# Patient Record
Sex: MALE | Race: BLACK OR AFRICAN AMERICAN | Employment: OTHER | URBAN - METROPOLITAN AREA
[De-identification: names, ages, dates, MRNs, and addresses within clinical notes are randomized per-mention and may not be internally consistent; named-entity substitution may affect disease eponyms.]

---

## 2022-07-22 ENCOUNTER — HOSPITAL ENCOUNTER (OUTPATIENT)
Age: 77
Setting detail: OBSERVATION
Discharge: HOME | End: 2022-07-24
Attending: EMERGENCY MEDICINE | Admitting: FAMILY MEDICINE
Payer: OTHER GOVERNMENT

## 2022-07-22 ENCOUNTER — APPOINTMENT (OUTPATIENT)
Dept: GENERAL RADIOLOGY | Age: 77
End: 2022-07-22
Payer: OTHER GOVERNMENT

## 2022-07-22 DIAGNOSIS — R07.9 CHEST PAIN, UNSPECIFIED TYPE: Primary | ICD-10-CM

## 2022-07-22 DIAGNOSIS — R06.09 DOE (DYSPNEA ON EXERTION): ICD-10-CM

## 2022-07-22 LAB
ALBUMIN SERPL-MCNC: 4.4 G/DL (ref 3.5–5.2)
ALP BLD-CCNC: 106 U/L (ref 40–129)
ALT SERPL-CCNC: 16 U/L (ref 0–40)
ANION GAP SERPL CALCULATED.3IONS-SCNC: 13 MMOL/L (ref 7–16)
AST SERPL-CCNC: 29 U/L (ref 0–39)
BASOPHILS ABSOLUTE: 0.03 E9/L (ref 0–0.2)
BASOPHILS RELATIVE PERCENT: 0.6 % (ref 0–2)
BILIRUB SERPL-MCNC: 0.4 MG/DL (ref 0–1.2)
BUN BLDV-MCNC: 23 MG/DL (ref 6–23)
CALCIUM SERPL-MCNC: 9.1 MG/DL (ref 8.6–10.2)
CHLORIDE BLD-SCNC: 102 MMOL/L (ref 98–107)
CO2: 27 MMOL/L (ref 22–29)
CREAT SERPL-MCNC: 1.4 MG/DL (ref 0.7–1.2)
EOSINOPHILS ABSOLUTE: 0.22 E9/L (ref 0.05–0.5)
EOSINOPHILS RELATIVE PERCENT: 4.1 % (ref 0–6)
GFR AFRICAN AMERICAN: 59
GFR NON-AFRICAN AMERICAN: 59 ML/MIN/1.73
GLUCOSE BLD-MCNC: 93 MG/DL (ref 74–99)
HCT VFR BLD CALC: 40.8 % (ref 37–54)
HEMOGLOBIN: 13.4 G/DL (ref 12.5–16.5)
IMMATURE GRANULOCYTES #: 0.01 E9/L
IMMATURE GRANULOCYTES %: 0.2 % (ref 0–5)
LYMPHOCYTES ABSOLUTE: 1.9 E9/L (ref 1.5–4)
LYMPHOCYTES RELATIVE PERCENT: 35.3 % (ref 20–42)
MCH RBC QN AUTO: 30.4 PG (ref 26–35)
MCHC RBC AUTO-ENTMCNC: 32.8 % (ref 32–34.5)
MCV RBC AUTO: 92.5 FL (ref 80–99.9)
MONOCYTES ABSOLUTE: 0.51 E9/L (ref 0.1–0.95)
MONOCYTES RELATIVE PERCENT: 9.5 % (ref 2–12)
NEUTROPHILS ABSOLUTE: 2.72 E9/L (ref 1.8–7.3)
NEUTROPHILS RELATIVE PERCENT: 50.3 % (ref 43–80)
PDW BLD-RTO: 13.1 FL (ref 11.5–15)
PLATELET # BLD: 172 E9/L (ref 130–450)
PMV BLD AUTO: 9.7 FL (ref 7–12)
POTASSIUM SERPL-SCNC: 3.9 MMOL/L (ref 3.5–5)
PRO-BNP: 327 PG/ML (ref 0–450)
RBC # BLD: 4.41 E12/L (ref 3.8–5.8)
SARS-COV-2, NAAT: NOT DETECTED
SODIUM BLD-SCNC: 142 MMOL/L (ref 132–146)
TOTAL PROTEIN: 7 G/DL (ref 6.4–8.3)
TROPONIN, HIGH SENSITIVITY: 16 NG/L (ref 0–11)
WBC # BLD: 5.4 E9/L (ref 4.5–11.5)

## 2022-07-22 PROCEDURE — 71046 X-RAY EXAM CHEST 2 VIEWS: CPT

## 2022-07-22 PROCEDURE — 87635 SARS-COV-2 COVID-19 AMP PRB: CPT

## 2022-07-22 PROCEDURE — 93005 ELECTROCARDIOGRAM TRACING: CPT | Performed by: NURSE PRACTITIONER

## 2022-07-22 PROCEDURE — 99285 EMERGENCY DEPT VISIT HI MDM: CPT

## 2022-07-22 PROCEDURE — 85378 FIBRIN DEGRADE SEMIQUANT: CPT

## 2022-07-22 PROCEDURE — 85025 COMPLETE CBC W/AUTO DIFF WBC: CPT

## 2022-07-22 PROCEDURE — 80053 COMPREHEN METABOLIC PANEL: CPT

## 2022-07-22 PROCEDURE — 83880 ASSAY OF NATRIURETIC PEPTIDE: CPT

## 2022-07-22 PROCEDURE — 84484 ASSAY OF TROPONIN QUANT: CPT

## 2022-07-22 NOTE — ED NOTES
Department of Emergency Medicine  FIRST PROVIDER TRIAGE NOTE             Independent MLP           7/22/22  4:47 PM EDT    Date of Encounter: 7/22/22   MRN: [id number]      HPI: Jose Daniel Corey is a 67 y.o. male who presents to the ED for Lyme Disease ([facility] told him to come here because he has the symptoms of lyme disease, rash on his left shoulder doesn't appear to be bulls eye, right hip pain, sometimes SOB)      ROS: Negative for fever or cough. PE: Gen Appearance/Constitutional: alert  Musculoskeletal: moves all extremities x 4     Initial Plan of Care: All treatment areas with department are currently occupied. Plan to order/Initiate the following while awaiting opening in ED: labs, EKG, and imaging studies.   Initiate Treatment-Testing, Proceed toTreatment Area When Bed Available for ED Attending/MLP to Continue Care    Electronically signed by MATEO Hernandez CNP   DD: 7/22/22      MATEO Bustos CNP  07/22/22 [id number]

## 2022-07-23 ENCOUNTER — APPOINTMENT (OUTPATIENT)
Dept: CT IMAGING | Age: 77
End: 2022-07-23
Payer: OTHER GOVERNMENT

## 2022-07-23 PROBLEM — R07.9 CHEST PAIN: Status: ACTIVE | Noted: 2022-07-23

## 2022-07-23 LAB
ANION GAP SERPL CALCULATED.3IONS-SCNC: 9 MMOL/L (ref 7–16)
BUN BLDV-MCNC: 22 MG/DL (ref 6–23)
CALCIUM SERPL-MCNC: 9.4 MG/DL (ref 8.6–10.2)
CHLORIDE BLD-SCNC: 104 MMOL/L (ref 98–107)
CHOLESTEROL, TOTAL: 236 MG/DL (ref 0–199)
CO2: 27 MMOL/L (ref 22–29)
CREAT SERPL-MCNC: 1.4 MG/DL (ref 0.7–1.2)
D DIMER: 278 NG/ML DDU
EKG ATRIAL RATE: 48 BPM
EKG P AXIS: 31 DEGREES
EKG P-R INTERVAL: 194 MS
EKG Q-T INTERVAL: 474 MS
EKG QRS DURATION: 88 MS
EKG QTC CALCULATION (BAZETT): 423 MS
EKG R AXIS: 5 DEGREES
EKG T AXIS: 59 DEGREES
EKG VENTRICULAR RATE: 48 BPM
GFR AFRICAN AMERICAN: 59
GFR NON-AFRICAN AMERICAN: 59 ML/MIN/1.73
GLUCOSE BLD-MCNC: 93 MG/DL (ref 74–99)
HDLC SERPL-MCNC: 81 MG/DL
LDL CHOLESTEROL CALCULATED: 143 MG/DL (ref 0–99)
LV EF: 53 %
LVEF MODALITY: NORMAL
POTASSIUM REFLEX MAGNESIUM: 4.6 MMOL/L (ref 3.5–5)
REASON FOR REJECTION: NORMAL
REASON FOR REJECTION: NORMAL
REJECTED TEST: NORMAL
REJECTED TEST: NORMAL
SODIUM BLD-SCNC: 140 MMOL/L (ref 132–146)
T4 FREE: 1.03 NG/DL (ref 0.93–1.7)
TRIGL SERPL-MCNC: 58 MG/DL (ref 0–149)
TROPONIN, HIGH SENSITIVITY: 14 NG/L (ref 0–11)
TSH SERPL DL<=0.05 MIU/L-ACNC: 7.35 UIU/ML (ref 0.27–4.2)
VLDLC SERPL CALC-MCNC: 12 MG/DL

## 2022-07-23 PROCEDURE — 86618 LYME DISEASE ANTIBODY: CPT

## 2022-07-23 PROCEDURE — 2580000003 HC RX 258: Performed by: NURSE PRACTITIONER

## 2022-07-23 PROCEDURE — 6370000000 HC RX 637 (ALT 250 FOR IP): Performed by: NURSE PRACTITIONER

## 2022-07-23 PROCEDURE — 6360000002 HC RX W HCPCS: Performed by: EMERGENCY MEDICINE

## 2022-07-23 PROCEDURE — 96361 HYDRATE IV INFUSION ADD-ON: CPT

## 2022-07-23 PROCEDURE — 99223 1ST HOSP IP/OBS HIGH 75: CPT | Performed by: INTERNAL MEDICINE

## 2022-07-23 PROCEDURE — 6370000000 HC RX 637 (ALT 250 FOR IP): Performed by: INTERNAL MEDICINE

## 2022-07-23 PROCEDURE — G0378 HOSPITAL OBSERVATION PER HR: HCPCS

## 2022-07-23 PROCEDURE — 96372 THER/PROPH/DIAG INJ SC/IM: CPT

## 2022-07-23 PROCEDURE — 80048 BASIC METABOLIC PNL TOTAL CA: CPT

## 2022-07-23 PROCEDURE — 84443 ASSAY THYROID STIM HORMONE: CPT

## 2022-07-23 PROCEDURE — APPSS60 APP SPLIT SHARED TIME 46-60 MINUTES: Performed by: NURSE PRACTITIONER

## 2022-07-23 PROCEDURE — 84484 ASSAY OF TROPONIN QUANT: CPT

## 2022-07-23 PROCEDURE — 80061 LIPID PANEL: CPT

## 2022-07-23 PROCEDURE — 2580000003 HC RX 258: Performed by: FAMILY MEDICINE

## 2022-07-23 PROCEDURE — 6370000000 HC RX 637 (ALT 250 FOR IP): Performed by: EMERGENCY MEDICINE

## 2022-07-23 PROCEDURE — 93306 TTE W/DOPPLER COMPLETE: CPT

## 2022-07-23 PROCEDURE — 96374 THER/PROPH/DIAG INJ IV PUSH: CPT

## 2022-07-23 PROCEDURE — 6360000002 HC RX W HCPCS: Performed by: FAMILY MEDICINE

## 2022-07-23 PROCEDURE — 84439 ASSAY OF FREE THYROXINE: CPT

## 2022-07-23 PROCEDURE — 36415 COLL VENOUS BLD VENIPUNCTURE: CPT

## 2022-07-23 RX ORDER — FINASTERIDE 5 MG/1
5 TABLET, FILM COATED ORAL DAILY
Status: DISCONTINUED | OUTPATIENT
Start: 2022-07-23 | End: 2022-07-24 | Stop reason: HOSPADM

## 2022-07-23 RX ORDER — ACETAMINOPHEN 650 MG/1
650 SUPPOSITORY RECTAL EVERY 6 HOURS PRN
Status: DISCONTINUED | OUTPATIENT
Start: 2022-07-23 | End: 2022-07-24 | Stop reason: HOSPADM

## 2022-07-23 RX ORDER — HYDRALAZINE HYDROCHLORIDE 20 MG/ML
10 INJECTION INTRAMUSCULAR; INTRAVENOUS EVERY 6 HOURS PRN
Status: DISCONTINUED | OUTPATIENT
Start: 2022-07-23 | End: 2022-07-24 | Stop reason: HOSPADM

## 2022-07-23 RX ORDER — ONDANSETRON 4 MG/1
4 TABLET, ORALLY DISINTEGRATING ORAL EVERY 8 HOURS PRN
Status: DISCONTINUED | OUTPATIENT
Start: 2022-07-23 | End: 2022-07-24 | Stop reason: HOSPADM

## 2022-07-23 RX ORDER — POLYETHYLENE GLYCOL 3350 17 G/17G
17 POWDER, FOR SOLUTION ORAL DAILY PRN
Status: DISCONTINUED | OUTPATIENT
Start: 2022-07-23 | End: 2022-07-24 | Stop reason: HOSPADM

## 2022-07-23 RX ORDER — HYDRALAZINE HYDROCHLORIDE 25 MG/1
25 TABLET, FILM COATED ORAL EVERY 8 HOURS SCHEDULED
Status: DISCONTINUED | OUTPATIENT
Start: 2022-07-23 | End: 2022-07-24 | Stop reason: HOSPADM

## 2022-07-23 RX ORDER — ONDANSETRON 2 MG/ML
4 INJECTION INTRAMUSCULAR; INTRAVENOUS EVERY 6 HOURS PRN
Status: DISCONTINUED | OUTPATIENT
Start: 2022-07-23 | End: 2022-07-24 | Stop reason: HOSPADM

## 2022-07-23 RX ORDER — LABETALOL HYDROCHLORIDE 5 MG/ML
10 INJECTION, SOLUTION INTRAVENOUS EVERY 6 HOURS PRN
Status: CANCELLED | OUTPATIENT
Start: 2022-07-23

## 2022-07-23 RX ORDER — AMLODIPINE BESYLATE 5 MG/1
5 TABLET ORAL DAILY
Status: DISCONTINUED | OUTPATIENT
Start: 2022-07-24 | End: 2022-07-24 | Stop reason: HOSPADM

## 2022-07-23 RX ORDER — MAGNESIUM HYDROXIDE/ALUMINUM HYDROXICE/SIMETHICONE 120; 1200; 1200 MG/30ML; MG/30ML; MG/30ML
30 SUSPENSION ORAL EVERY 6 HOURS PRN
Status: DISCONTINUED | OUTPATIENT
Start: 2022-07-23 | End: 2022-07-24 | Stop reason: HOSPADM

## 2022-07-23 RX ORDER — ASPIRIN 81 MG/1
81 TABLET, CHEWABLE ORAL DAILY
Status: DISCONTINUED | OUTPATIENT
Start: 2022-07-24 | End: 2022-07-24 | Stop reason: HOSPADM

## 2022-07-23 RX ORDER — HYDRALAZINE HYDROCHLORIDE 20 MG/ML
5 INJECTION INTRAMUSCULAR; INTRAVENOUS ONCE
Status: COMPLETED | OUTPATIENT
Start: 2022-07-23 | End: 2022-07-23

## 2022-07-23 RX ORDER — LEVOTHYROXINE SODIUM 0.05 MG/1
50 TABLET ORAL DAILY
Status: DISCONTINUED | OUTPATIENT
Start: 2022-07-23 | End: 2022-07-24 | Stop reason: HOSPADM

## 2022-07-23 RX ORDER — AMLODIPINE BESYLATE 10 MG/1
10 TABLET ORAL DAILY
Status: DISCONTINUED | OUTPATIENT
Start: 2022-07-23 | End: 2022-07-23

## 2022-07-23 RX ORDER — SODIUM CHLORIDE 9 MG/ML
INJECTION, SOLUTION INTRAVENOUS PRN
Status: DISCONTINUED | OUTPATIENT
Start: 2022-07-23 | End: 2022-07-24 | Stop reason: HOSPADM

## 2022-07-23 RX ORDER — SODIUM CHLORIDE 0.9 % (FLUSH) 0.9 %
5-40 SYRINGE (ML) INJECTION PRN
Status: DISCONTINUED | OUTPATIENT
Start: 2022-07-23 | End: 2022-07-24 | Stop reason: HOSPADM

## 2022-07-23 RX ORDER — ASPIRIN 81 MG/1
324 TABLET, CHEWABLE ORAL ONCE
Status: COMPLETED | OUTPATIENT
Start: 2022-07-23 | End: 2022-07-23

## 2022-07-23 RX ORDER — NITROGLYCERIN 0.4 MG/1
0.4 TABLET SUBLINGUAL EVERY 5 MIN PRN
Status: DISCONTINUED | OUTPATIENT
Start: 2022-07-23 | End: 2022-07-24 | Stop reason: HOSPADM

## 2022-07-23 RX ORDER — ATORVASTATIN CALCIUM 40 MG/1
40 TABLET, FILM COATED ORAL NIGHTLY
Status: DISCONTINUED | OUTPATIENT
Start: 2022-07-23 | End: 2022-07-24 | Stop reason: HOSPADM

## 2022-07-23 RX ORDER — ENOXAPARIN SODIUM 100 MG/ML
40 INJECTION SUBCUTANEOUS DAILY
Status: DISCONTINUED | OUTPATIENT
Start: 2022-07-23 | End: 2022-07-24 | Stop reason: HOSPADM

## 2022-07-23 RX ORDER — SODIUM CHLORIDE 0.9 % (FLUSH) 0.9 %
5-40 SYRINGE (ML) INJECTION EVERY 12 HOURS SCHEDULED
Status: DISCONTINUED | OUTPATIENT
Start: 2022-07-23 | End: 2022-07-24 | Stop reason: HOSPADM

## 2022-07-23 RX ORDER — ACETAMINOPHEN 325 MG/1
650 TABLET ORAL EVERY 6 HOURS PRN
Status: DISCONTINUED | OUTPATIENT
Start: 2022-07-23 | End: 2022-07-24 | Stop reason: HOSPADM

## 2022-07-23 RX ORDER — SODIUM CHLORIDE 9 MG/ML
INJECTION, SOLUTION INTRAVENOUS CONTINUOUS
Status: DISCONTINUED | OUTPATIENT
Start: 2022-07-23 | End: 2022-07-24 | Stop reason: HOSPADM

## 2022-07-23 RX ADMIN — ASPIRIN 324 MG: 81 TABLET, CHEWABLE ORAL at 01:18

## 2022-07-23 RX ADMIN — HYDRALAZINE HYDROCHLORIDE 25 MG: 25 TABLET, FILM COATED ORAL at 15:12

## 2022-07-23 RX ADMIN — SODIUM CHLORIDE: 9 INJECTION, SOLUTION INTRAVENOUS at 20:53

## 2022-07-23 RX ADMIN — SODIUM CHLORIDE: 9 INJECTION, SOLUTION INTRAVENOUS at 09:14

## 2022-07-23 RX ADMIN — AMLODIPINE BESYLATE 5 MG: 10 TABLET ORAL at 15:12

## 2022-07-23 RX ADMIN — HYDRALAZINE HYDROCHLORIDE 5 MG: 20 INJECTION INTRAMUSCULAR; INTRAVENOUS at 01:18

## 2022-07-23 RX ADMIN — ENOXAPARIN SODIUM 40 MG: 100 INJECTION SUBCUTANEOUS at 09:30

## 2022-07-23 RX ADMIN — HYDRALAZINE HYDROCHLORIDE 25 MG: 25 TABLET, FILM COATED ORAL at 20:54

## 2022-07-23 RX ADMIN — Medication 10 ML: at 10:00

## 2022-07-23 RX ADMIN — Medication 10 ML: at 20:54

## 2022-07-23 ASSESSMENT — PAIN - FUNCTIONAL ASSESSMENT: PAIN_FUNCTIONAL_ASSESSMENT: NONE - DENIES PAIN

## 2022-07-23 ASSESSMENT — PAIN SCALES - GENERAL
PAINLEVEL_OUTOF10: 0
PAINLEVEL_OUTOF10: 0

## 2022-07-23 NOTE — CONSULTS
Inpatient Cardiology Consultation      Reason for Consult:  Chest pain     Consulting Physician: Dr. Mallory Woodall    Requesting Physician:  Dr. Sivakumar Schmidt    Date of Consultation: 7/23/2022    HISTORY OF PRESENT ILLNESS:   Mr. Wyatt Falcon is a 69-year-old male who is new to [address] [place] [address]. Patient follows with the [facility] for the majority of his care. She does report being evaluated by cardiology at the [place] approximately 2 years ago and reports a \"normal stress test and echocardiogram.\"  At that time he was found to have left carotid stenosis on an ultrasound with no intervention needed at that time. PMHx: Left carotid stenosis (reported having an US ~ 2 years ago at [facility]), Hypothyroidism, Arthritis, BPH and HTN. [facility] on 7/22/2022 sent in from [facility] on [address]. Patient was told to come into the ED for possible Lyme disease. Patient reported approximately 7 days ago he began trimming his bushes outside and he suddenly noticed a rash over his left shoulder into his left neck with redness and itchiness. He stated since that time the rash has improved but he began noticing right lower extremity weakness in his hip and knee, MELTON x1 week ambulating up and down his basement steps along with left chest wall \"fluttering\" occurring while at rest, intermittent and lasting for seconds. He denies chest pain, pressure, tightness or burning. Approximately 2 days ago he began noticing a headache in the back of his head radiating into his neck for which he associated his symptoms being due to high blood pressure. He admits to intermittently taking his Norvasc and over the past month he has not taken it at all. Due to multiple symptoms he went to the [place] clinic and was sent into the ED for concerns of Lyme disease. Upon arrival to the ED: /106, heart rate 66, afebrile, 98% on room air. Labs: Sodium 142, potassium 3.9, BUN 23, creatinine 1.4, proBNP 327. High-sensitivity troponin 16, 14. TSH 7.350. Cholesterol panel: Total cholesterol 236, HDL 81, . WBC 5.4, H/H13.4/40.8, platelet count 137. D-dimer 278. SARS-CoV-2: Negative. Chest x-ray: No acute process. EKG: SB with marked sinus arrhythmia, nonspecific ST-T wave changes, rate 48 bpm. ER medications:  mg QD, Hydralazine 5 mg IV x 1. Upon initial consultation, patient is laying nearly flat in bed and appears to be in no acute distress. He denies any chest pain or shortness of breath. He has not felt palpitations since prior to admission. VSS. Currently on telemetry monitor he is sinus bradycardia /sinus rhythm. Please note: past medical records were reviewed per electronic medical record (EMR) - see detailed reports under Past Medical/ Surgical History. Past Medical History:    HTN  Hypothyroidism, on replacement therapy   Arthritis   BPH: on flomax  Reported normal echo and stress test at [facility], [address] [place] [address]) approximately 2 years ago. Reported carotid ultrasound showing left carotid stenosis per patient approximately 2 years ago at [place]. Further details are unknown. Medications Prior to admit:  Prior to Admission medications    Medication Sig Start Date End Date Taking?  Authorizing Provider   amLODIPine (NORVASC) 5 MG tablet Take 2 tablets by mouth daily 4/3/17   MATEO Morton CNP   docusate sodium (COLACE, DULCOLAX) 100 MG CAPS Take 100 mg by mouth nightly 4/3/17   MATEO Morton CNP   levothyroxine (SYNTHROID) 50 MCG tablet Take 1 tablet by mouth Daily 4/3/17   MATEO Morton CNP   finasteride (PROSCAR) 5 MG tablet Take 5 mg by mouth daily    Historical Provider, MD   tamsulosin (FLOMAX) 0.4 MG capsule Take 1 capsule by mouth daily for 14 days 2/22/17 3/30/17  Maria D Melo PA-C       Current Medications:    Current Facility-Administered Medications: amLODIPine (NORVASC) tablet 10 mg, 10 mg, Oral, Daily  finasteride (PROSCAR) tablet 5 mg, 5 mg, Oral, Daily  levothyroxine (SYNTHROID) tablet 50 mcg, 50 mcg, Oral, Daily  sodium chloride flush 0.9 % injection 5-40 mL, 5-40 mL, IntraVENous, 2 times per day  sodium chloride flush 0.9 % injection 5-40 mL, 5-40 mL, IntraVENous, PRN  0.9 % sodium chloride infusion, , IntraVENous, PRN  ondansetron (ZOFRAN-ODT) disintegrating tablet 4 mg, 4 mg, Oral, Q8H PRN **OR** ondansetron (ZOFRAN) injection 4 mg, 4 mg, IntraVENous, Q6H PRN  acetaminophen (TYLENOL) tablet 650 mg, 650 mg, Oral, Q6H PRN **OR** acetaminophen (TYLENOL) suppository 650 mg, 650 mg, Rectal, Q6H PRN  polyethylene glycol (GLYCOLAX) packet 17 g, 17 g, Oral, Daily PRN  [START ON 7/24/2022] aspirin chewable tablet 81 mg, 81 mg, Oral, Daily  atorvastatin (LIPITOR) tablet 40 mg, 40 mg, Oral, Nightly  perflutren lipid microspheres (DEFINITY) injection 1.65 mg, 1.5 mL, IntraVENous, ONCE PRN  aluminum & magnesium hydroxide-simethicone (MAALOX) 200-200-20 MG/5ML suspension 30 mL, 30 mL, Oral, Q6H PRN  enoxaparin (LOVENOX) injection 40 mg, 40 mg, SubCUTAneous, Daily  nitroGLYCERIN (NITROSTAT) SL tablet 0.4 mg, 0.4 mg, SubLINGual, Q5 Min PRN  iopamidol (ISOVUE-370) 76 % injection 60 mL, 60 mL, IntraVENous, ONCE PRN    Allergies:  Patient has no known allergies. Social History:    He lives with his wife at home  Denies using a walker or cane for ambulation. Lifelong non-smoker  Denies alcohol and illicit drug use. Family History: Noncontributory due to advanced age. REVIEW OF SYSTEMS:     Constitutional: + fatigue. Denies fevers, chills or night sweats  Eyes: Denies visual changes or drainage  ENT: Denies headaches or hearing loss. No mouth sores or sore throat. No epistaxis   Cardiovascular: Denies chest pain, pressure. +palpitations. No lower extremity swelling. Respiratory: Denies MELTON, cough, orthopnea or PND. No hemoptysis   Gastrointestinal: Denies hematemesis or anorexia. No hematochezia or melena    Genitourinary: Denies urgency, dysuria or hematuria. Musculoskeletal: See HPI.   Denies gait disturbance, weakness or joint complaints  Integumentary: +Rash left shoulder / left neck. Neurological: Denies dizziness, headaches or seizures. No numbness or tingling  Psychiatric: Denies anxiety or depression. Endocrine: Denies temperature intolerance. No recent weight change. .  Hematologic/Lymphatic: Denies abnormal bruising or bleeding. No swollen lymph nodes    PHYSICAL EXAM:   BP (!) 175/97   Pulse 62   Temp 97.9 °F (36.6 °C) (Temporal)   Resp 16   Ht 5' 8\" (1.727 m)   Wt 165 lb (74.8 kg)   SpO2 100%   BMI 25.09 kg/m²   CONST:  Well developed, well nourished  elderly AA male who appears of stated age. Awake, alert and cooperative. No apparent distress. HEENT:   Head- Normocephalic, atraumatic   Eyes- Conjunctivae pink, anicteric  Throat- Oral mucosa pink and moist  Neck-  No stridor, trachea midline, no jugular venous distention. +Faint left  carotid bruit. CHEST: Chest symmetrical and non-tender to palpation. No accessory muscle use or intercostal retractions  RESPIRATORY: Lung sounds - clear throughout fields. On RA. CARDIOVASCULAR:     Heart Inspection- shows no noted pulsations  Heart Palpation- no heaves or thrills; PMI is non-displaced   Heart Ausculation- Regular rate and rhythm, no murmur. No s3, s4 or rub   PV: No lower extremity edema. No varicosities. Pedal pulses palpable, no clubbing or cyanosis   ABDOMEN: Soft, non-tender to light palpation. Bowel sounds present. No palpable masses no organomegaly; no abdominal bruit  MS: Good muscle strength and tone. No atrophy or abnormal movements. : Deferred  SKIN: Warm and dry no statis dermatitis or ulcers   NEURO / PSYCH: Oriented to person, place and time. Speech clear and appropriate. Follows all commands. Flat affect. DATA:    ECG: See HPI. Tele strips: SB/SR with occasional PVCs.   Diagnostic:      Labs:   CBC:   Recent Labs     07/22/22  1838   WBC 5.4   HGB 13.4   HCT 40.8        BMP:   Recent Labs 07/22/22 1838 07/23/22  0605    140   K 3.9 4.6   CO2 27 27   BUN 23 22   CREATININE 1.4* 1.4*   LABGLOM 59 59   CALCIUM 9.1 9.4     Mag: No results for input(s): MG in the last 72 hours. Phos: No results for input(s): PHOS in the last 72 hours. TFT:   Lab Results   Component Value Date    TSH 7.350 (H) 07/23/2022      HgA1c: No results found for: LABA1C  No results found for: EAG  proBNP:   Recent Labs     07/22/22 1838   PROBNP 327     PT/INR: No results for input(s): PROTIME, INR in the last 72 hours. APTT:No results for input(s): APTT in the last 72 hours. CARDIAC ENZYMES:  Recent Labs     07/22/22 1838 07/23/22  0605   TROPHS 16* 14*     FASTING LIPID PANEL:  Lab Results   Component Value Date/Time    CHOL 236 07/23/2022 06:05 AM    HDL 81 07/23/2022 06:05 AM    LDLCALC 143 07/23/2022 06:05 AM    TRIG 58 07/23/2022 06:05 AM     LIVER PROFILE:  Recent Labs     07/22/22 1838   AST 29   ALT 16   LABALBU 4.4     Chest x-ray: 7/22/2022:  No acute process. Assessment/plan to follow as per Dr. Maria Luisa Anderson.     Electronically signed by MATEO Arevalo CNP on 7/23/22 at 12:32 PM EDT

## 2022-07-23 NOTE — H&P
Hospital Medicine History & Physical      PCP: No primary care provider on file. Date of Admission: 7/22/2022    Date of Service: Pt seen/examined on 7/23/22  and  Placed in Observation. Chief Complaint:  SOB       History Of Present Illness:    67 y.o. male with past medial history of HTN and Hypothyroidism . Patient presents to the Ed due to chest pressure and shortness of breath . He also also reports generalized body aches and brain fog . He states that thought he had lyme disease . 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 He reports no tick bite but  rash located on shoulder lateral neck area  He states that he has shortness of breath on laying flat . He reports chest pressure in sternal area . He reports no neck jaw pain or arm pain . He reports diaphoresis as well. He states that he has had these symptoms for the  past 5 two weeks. He reports no fever chills . He also states that he has not taken his blood pressure medication in several weeks. In the Ed patient was 203/88 1000 %on RA afebrile . Lab data reveal sodium 142   potassium 3.9 BUN 23 creatinine 1.4    Trop 16 WBC 5.4 HGB 13.4  COVID neg Dimer 278 EKG sinus arrhthymia HR 48 CXR neg . Patient will be admitted for further evaluation     Past Medical History:          Diagnosis Date    Arthritis     Hypertension     Thyroid disease        Past Surgical History:      History reviewed. No pertinent surgical history. Medications Prior to Admission:      Prior to Admission medications    Medication Sig Start Date End Date Taking?  Authorizing Provider   amLODIPine (NORVASC) 5 MG tablet Take 2 tablets by mouth daily 4/3/17   MATEO Morton CNP   docusate sodium (COLACE, DULCOLAX) 100 MG CAPS Take 100 mg by mouth nightly 4/3/17   MAETO Morton CNP   levothyroxine (SYNTHROID) 50 MCG tablet Take 1 tablet by mouth Daily 4/3/17   MATEO Morton CNP   finasteride (PROSCAR) 5 MG tablet Take 5 mg by mouth daily Historical Provider, MD   tamsulosin (FLOMAX) 0.4 MG capsule Take 1 capsule by mouth daily for 14 days 2/22/17 3/30/17  Duyen Melo PA-C       Allergies:  Patient has no known allergies. Social History:      The patient currently lives with family     TOBACCO:   reports that he has never smoked. He has never used smokeless tobacco.  ETOH:   reports current alcohol use. Family History:      Reviewed in detail and negative for DM, CAD, Cancer, CVA. Positive as follows:    History reviewed. No pertinent family history. REVIEW OF SYSTEMS:   Pertinent positives as noted in the HPI. All other systems reviewed and negative. PHYSICAL EXAM:    BP (!) 203/88   Pulse 59   Temp 98 °F (36.7 °C)   Resp 18   Ht 5' 8\" (1.727 m)   Wt 165 lb (74.8 kg)   SpO2 100%   BMI 25.09 kg/m²     General appearance:  No apparent distress, appears stated age and cooperative. HEENT:  Normal cephalic, atraumatic without obvious deformity. Pupils equal, round, and reactive to light. Extra ocular muscles intact. Conjunctivae/corneas clear. Neck: Supple, with full range of motion. No jugular venous distention. Trachea midline. Respiratory:  Normal respiratory effort. Clear to auscultation, bilaterally without Rales/Wheezes/Rhonchi. Cardiovascular:   CP not reproducible on palpation Regular rate and rhythm with normal S1/S2 without murmurs, rubs or gallops. Abdomen: Soft, non-tender, non-distended with normal bowel sounds. Musculoskeletal:  No clubbing, cyanosis or edema bilaterally. Full range of motion without deformity. Skin: Skin color, texture, turgor normal.  No rashes or lesions. Neurologic:  Neurovascularly intact without any focal sensory/motor deficits.  Cranial nerves: II-XII intact, grossly non-focal.  Psychiatric:  Alert and oriented, thought content appropriate, normal insight      Labs:     Recent Labs     07/22/22 1838   WBC 5.4   HGB 13.4   HCT 40.8        Recent Labs     07/22/22 1838    K 3.9      CO2 27   BUN 23   CREATININE 1.4*   CALCIUM 9.1     Recent Labs     07/22/22  1838   AST 29   ALT 16   BILITOT 0.4   ALKPHOS 106     No results for input(s): INR in the last 72 hours. No results for input(s): Rosemary Height in the last 72 hours. Urinalysis:      Lab Results   Component Value Date/Time    NITRU Negative 03/30/2017 12:10 PM    [address] NONE 03/30/2017 12:10 PM    BACTERIA PRESENT 03/30/2017 12:10 PM    RBCUA >20 03/30/2017 12:10 PM    BLOODU LARGE 03/30/2017 12:10 PM    SPECGRAV >=1.030 03/30/2017 12:10 PM    GLUCOSEU Negative 03/30/2017 12:10 PM         ASSESSMENT:    Active Hospital Problems    Diagnosis Date Noted    Chest pain [R07.9] 07/23/2022     Priority: Medium       PLAN:    Chest pain   ?if secondary to elevated BP . BP was 218/106. Patient reports not taking BP meds for several weeks  Trop 15 EKG revealed no acute ST changes but sinus bradycardia . CXR neg Dimer 278 . Admit observation vitals telemetry CTA chest cardiology consult echo BP control Lipid TSH     Hypertensive Urgency : BP was 218/106 sec to non compliance . C/w Norvasc hydralazine PRN     Acute Kidney injury : Scr 1.4 baseline 1.1-1.2     ? Lyme Disease : no bull eye rash noted , ordered Lyme studies     Hypothyroidism : C/w Synthroid             DVT Prophylaxis: Lovenox   Diet: No diet orders on file  Code Status: No Order      Dispo - likely home        Tuan Sanchez MD

## 2022-07-23 NOTE — PROGRESS NOTES
Patient is refusing scan at this time stating he does not want contrast.  This technologist spoke with Dr. Corrinne Distel to inform him of patient's decision.

## 2022-07-23 NOTE — PLAN OF CARE
Problem: Pain  Goal: Verbalizes/displays adequate comfort level or baseline comfort level  Outcome: Progressing     Problem: Discharge Planning  Goal: Discharge to home or other facility with appropriate resources  Recent Flowsheet Documentation  Taken 7/23/2022 0505 by Gayatri Turcios RN  Discharge to home or other facility with appropriate resources: Identify barriers to discharge with patient and caregiver

## 2022-07-23 NOTE — ED PROVIDER NOTES
Department of Emergency Medicine   ED  Provider Note  Admit Date/RoomTime: 7/22/2022 11:35 PM  ED Room: [facility]"" [place]          History of Present Illness:  7/22/22, Time: 11:55 PM EDT  Chief Complaint   Patient presents with    Shortness of [address] told him to come here because he has the symptoms of lyme disease, rash on his left shoulder doesn't appear to be bulls eye, right hip pain, sometimes SOB                Anca Romero is a 67 y.o. male presenting to the ED for chest pain and shortness of breath, beginning 2 weeks ago. The complaint has been intermittent, moderate in severity, and worsened by exertion. Patient reports she is having exertional dyspnea as well as tightness in the chest for 2 weeks. He says he feels fatigued and out of gas. He says that he feels short of breath. He is also having some orthopnea. He reports a heavy tightness in his chest as well. He also reports he has been tired and achy. He reports that he called the [place] and they thought he could have Lyme disease and they told him to come here. He also reports he noticed a small scaly rash along his left shoulder/lateral neck area. No bull's-eye lesions. No tenderness. He says it itches. He denies any tick bites or exposures. No fever or chills. No abdominal pain or back pain. No syncope. No joint swelling. No joint effusions. Review of Systems:   A complete review of systems was performed and pertinent positives and negatives are stated within HPI, all other systems reviewed and are negative.        --------------------------------------------- PAST HISTORY ---------------------------------------------  Past Medical History:  has a past medical history of Arthritis, Hypertension, and Thyroid disease. Past Surgical History:  has no past surgical history on file. Social History:  reports that he has never smoked. He has never used smokeless tobacco. He reports current alcohol use.  He reports that he does not use drugs. Family History: family history is not on file. . Unless otherwise noted, family history is non contributory    The patients home medications have been reviewed. Allergies: Patient has no known allergies. I have reviewed the past medical history, past surgical history, social history, and family history    ---------------------------------------------------PHYSICAL EXAM--------------------------------------    Constitutional/General: Alert and oriented x3  Head: Normocephalic and atraumatic  Eyes: PERRL, EOMI, sclera non icteric  ENT: Oropharynx clear, handling secretions   Neck: Supple, full ROM, no stridor, no meningeal signs  Respiratory: Lungs clear to auscultation bilaterally, no wheezes, rales, or rhonchi. Not in respiratory distress  Cardiovascular: Bradycardic but regular rhythm. No murmurs, no gallops, no rubs. 2+ distal pulses. Equal extremity pulses. Gastrointestinal:  Abdomen Soft, Non tender, Non distended. No rebound, guarding, or rigidity. No pulsatile masses. Musculoskeletal: Moves all extremities x 4. Warm and well perfused, no clubbing, no cyanosis, no edema. Capillary refill <3 seconds  Skin: skin warm and dry. No target lesion or bull's-eye rash. He has approximately a nickel sized area of scaly skin along his left lateral neck along the clavicle area although it is not a bull's-eye rash, no erythema migrans, no crepitus, nontender. Neurologic: GCS 15, no focal deficits, symmetric strength 5/5 in the upper and lower extremities bilaterally  Psychiatric: Normal Affect          -------------------------------------------------- RESULTS -------------------------------------------------  I have personally reviewed all laboratory and imaging results for this patient. Results are listed below.      LABS: (Lab results interpreted by me)  Results for orders placed or performed during the hospital encounter of 07/22/22   COVID-19, Rapid    Specimen: Nasopharyngeal Swab Result Value Ref Range    SARS-CoV-2, NAAT Not Detected Not Detected   CBC with Auto Differential   Result Value Ref Range    WBC 5.4 4.5 - 11.5 E9/L    RBC 4.41 3.80 - 5.80 E12/L    Hemoglobin 13.4 12.5 - 16.5 g/dL    Hematocrit 40.8 37.0 - 54.0 %    MCV 92.5 80.0 - 99.9 fL    MCH 30.4 26.0 - 35.0 pg    MCHC 32.8 32.0 - 34.5 %    RDW 13.1 11.5 - 15.0 fL    Platelets 904 618 - 191 E9/L    MPV 9.7 7.0 - 12.0 fL    Neutrophils % 50.3 43.0 - 80.0 %    Immature Granulocytes % 0.2 0.0 - 5.0 %    Lymphocytes % 35.3 20.0 - 42.0 %    Monocytes % 9.5 2.0 - 12.0 %    Eosinophils % 4.1 0.0 - 6.0 %    Basophils % 0.6 0.0 - 2.0 %    Neutrophils Absolute 2.72 1.80 - 7.30 E9/L    Immature Granulocytes # 0.01 E9/L    Lymphocytes Absolute 1.90 1.50 - 4.00 E9/L    Monocytes Absolute 0.51 0.10 - 0.95 E9/L    Eosinophils Absolute 0.22 0.05 - 0.50 E9/L    Basophils Absolute 0.03 0.00 - 0.20 E9/L   Comprehensive Metabolic Panel   Result Value Ref Range    Sodium 142 132 - 146 mmol/L    Potassium 3.9 3.5 - 5.0 mmol/L    Chloride 102 98 - 107 mmol/L    CO2 27 22 - 29 mmol/L    Anion Gap 13 7 - 16 mmol/L    Glucose 93 74 - 99 mg/dL    BUN 23 6 - 23 mg/dL    Creatinine 1.4 (H) 0.7 - 1.2 mg/dL    GFR Non-African American 59 >=60 mL/min/1.73    GFR African American 59     Calcium 9.1 8.6 - 10.2 mg/dL    Total Protein 7.0 6.4 - 8.3 g/dL    Albumin 4.4 3.5 - 5.2 g/dL    Total Bilirubin 0.4 0.0 - 1.2 mg/dL    Alkaline Phosphatase 106 40 - 129 U/L    ALT 16 0 - 40 U/L    AST 29 0 - 39 U/L   Troponin   Result Value Ref Range    Troponin, High Sensitivity 16 (H) 0 - 11 ng/L   Brain Natriuretic Peptide   Result Value Ref Range    Pro- 0 - 450 pg/mL   EKG 12 Lead   Result Value Ref Range    Ventricular Rate 48 BPM    Atrial Rate 48 BPM    P-R Interval 194 ms    QRS Duration 88 ms    Q-T Interval 474 ms    QTc Calculation (Bazett) 423 ms    P Axis 31 degrees    R Axis 5 degrees    T Axis 59 degrees   ,       RADIOLOGY:  Interpreted by Radiologist unless otherwise specified  XR CHEST (2 VW)   Final Result   No acute process. EKG Interpretation  Interpreted by emergency department physician, Dr. Barbara Musa     Date of EK22  Time:     Rhythm: sinus bradycardia  Rate: bradycardia  Axis: normal  Conduction: normal  ST Segments: no acute change  T Waves: biphasic in V3    Clinical Impression: Sinus bradycardia with sinus arrhythmia with new biphasic T wave in V3  Comparison to prior EKG: changes compared to previous EKG      ------------------------- NURSING NOTES AND VITALS REVIEWED ---------------------------   The nursing notes within the ED encounter and vital signs as below have been reviewed by myself  BP (!) 159/106   Pulse 66   Temp 98 °F (36.7 °C)   Resp 19   Ht 5' 8\" (1.727 m)   Wt 165 lb (74.8 kg)   SpO2 98%   BMI 25.09 kg/m²     Oxygen Saturation Interpretation: Normal    The patients available past medical records and past encounters were reviewed. ------------------------------ ED COURSE/MEDICAL DECISION MAKING----------------------  Medications   aspirin chewable tablet 324 mg (has no administration in time range)              I, Dr. Barbara Musa, am the primary provider of record    Medical Decision Making:   I am concerned with his exertional dyspnea as well as exertional pressure in his chest.  He also has some orthopnea as well. I see no rash to support Lyme disease and there was no tick bite and while he does feel fatigued I see no signs of heart block on his EKG and again no rash or other symptoms except the fatigue. I see no infectious sx at this time either. He does have what appears to be a Wellens sign in lead V3 which is new from prior. His troponin was 15 and this will be repeated. He has no chest pain at this time. No recent cardiac evaluation or stress testing. I have consulted medicine for admission for further evaluation. Oxygen Saturation Interpretation: 98 % on RA.

## 2022-07-23 NOTE — PROGRESS NOTES
Hospitalist Progress Note      Patient admitted this morning by my colleague for chest pain, YAJARIA, and hypertensive urgency. He admitted to noncompliance with his BP meds. Cardiology has been consulted. CTA of the chest was ordered given elevated D-dimer, however, patient is refusing. TSH is elevated though compliance with home levothyroxine is in question. Will initiate IV fluids, monitor renal function. Non-billable rounding. Thanks for allowing us to participate in the care of Toya Tierney. We will continue to follow along.  Please do not hesitate to contact us if needed.  +++++++++++++++++++++++++++++++++++++++++++++++++  BLU Dumas/ Dom Medel [address] [place] [address]

## 2022-07-24 ENCOUNTER — APPOINTMENT (OUTPATIENT)
Dept: NUCLEAR MEDICINE | Age: 77
End: 2022-07-24
Payer: OTHER GOVERNMENT

## 2022-07-24 VITALS
HEART RATE: 53 BPM | BODY MASS INDEX: 25.01 KG/M2 | RESPIRATION RATE: 18 BRPM | DIASTOLIC BLOOD PRESSURE: 99 MMHG | SYSTOLIC BLOOD PRESSURE: 161 MMHG | OXYGEN SATURATION: 99 % | HEIGHT: 68 IN | TEMPERATURE: 97.4 F | WEIGHT: 165 LBS

## 2022-07-24 LAB
ANION GAP SERPL CALCULATED.3IONS-SCNC: 11 MMOL/L (ref 7–16)
BUN BLDV-MCNC: 19 MG/DL (ref 6–23)
CALCIUM SERPL-MCNC: 8.9 MG/DL (ref 8.6–10.2)
CHLORIDE BLD-SCNC: 107 MMOL/L (ref 98–107)
CO2: 21 MMOL/L (ref 22–29)
CREAT SERPL-MCNC: 1.3 MG/DL (ref 0.7–1.2)
GFR AFRICAN AMERICAN: >60
GFR NON-AFRICAN AMERICAN: >60 ML/MIN/1.73
GLUCOSE BLD-MCNC: 85 MG/DL (ref 74–99)
HCT VFR BLD CALC: 40.5 % (ref 37–54)
HEMOGLOBIN: 13.6 G/DL (ref 12.5–16.5)
LV EF: 53 %
LVEF MODALITY: NORMAL
MCH RBC QN AUTO: 30 PG (ref 26–35)
MCHC RBC AUTO-ENTMCNC: 33.6 % (ref 32–34.5)
MCV RBC AUTO: 89.4 FL (ref 80–99.9)
PDW BLD-RTO: 13.1 FL (ref 11.5–15)
PLATELET # BLD: 177 E9/L (ref 130–450)
PMV BLD AUTO: 9.7 FL (ref 7–12)
POTASSIUM REFLEX MAGNESIUM: 3.7 MMOL/L (ref 3.5–5)
RBC # BLD: 4.53 E12/L (ref 3.8–5.8)
SODIUM BLD-SCNC: 139 MMOL/L (ref 132–146)
WBC # BLD: 4.4 E9/L (ref 4.5–11.5)

## 2022-07-24 PROCEDURE — 3430000000 HC RX DIAGNOSTIC RADIOPHARMACEUTICAL: Performed by: RADIOLOGY

## 2022-07-24 PROCEDURE — G0378 HOSPITAL OBSERVATION PER HR: HCPCS

## 2022-07-24 PROCEDURE — 93018 CV STRESS TEST I&R ONLY: CPT | Performed by: INTERNAL MEDICINE

## 2022-07-24 PROCEDURE — 2580000003 HC RX 258: Performed by: FAMILY MEDICINE

## 2022-07-24 PROCEDURE — 93017 CV STRESS TEST TRACING ONLY: CPT

## 2022-07-24 PROCEDURE — 96372 THER/PROPH/DIAG INJ SC/IM: CPT

## 2022-07-24 PROCEDURE — 99232 SBSQ HOSP IP/OBS MODERATE 35: CPT | Performed by: INTERNAL MEDICINE

## 2022-07-24 PROCEDURE — 6370000000 HC RX 637 (ALT 250 FOR IP): Performed by: NURSE PRACTITIONER

## 2022-07-24 PROCEDURE — 6360000002 HC RX W HCPCS: Performed by: NURSE PRACTITIONER

## 2022-07-24 PROCEDURE — 36415 COLL VENOUS BLD VENIPUNCTURE: CPT

## 2022-07-24 PROCEDURE — 85027 COMPLETE CBC AUTOMATED: CPT

## 2022-07-24 PROCEDURE — 96361 HYDRATE IV INFUSION ADD-ON: CPT

## 2022-07-24 PROCEDURE — 80048 BASIC METABOLIC PNL TOTAL CA: CPT

## 2022-07-24 PROCEDURE — 78452 HT MUSCLE IMAGE SPECT MULT: CPT | Performed by: INTERNAL MEDICINE

## 2022-07-24 PROCEDURE — 6370000000 HC RX 637 (ALT 250 FOR IP): Performed by: FAMILY MEDICINE

## 2022-07-24 PROCEDURE — 6370000000 HC RX 637 (ALT 250 FOR IP): Performed by: INTERNAL MEDICINE

## 2022-07-24 PROCEDURE — A9500 TC99M SESTAMIBI: HCPCS | Performed by: RADIOLOGY

## 2022-07-24 PROCEDURE — 78452 HT MUSCLE IMAGE SPECT MULT: CPT

## 2022-07-24 PROCEDURE — 93016 CV STRESS TEST SUPVJ ONLY: CPT | Performed by: INTERNAL MEDICINE

## 2022-07-24 PROCEDURE — 93005 ELECTROCARDIOGRAM TRACING: CPT | Performed by: FAMILY MEDICINE

## 2022-07-24 PROCEDURE — 6360000002 HC RX W HCPCS: Performed by: FAMILY MEDICINE

## 2022-07-24 RX ORDER — HYDRALAZINE HYDROCHLORIDE 25 MG/1
25 TABLET, FILM COATED ORAL EVERY 8 HOURS SCHEDULED
Qty: 90 TABLET | Refills: 0 | Status: SHIPPED | OUTPATIENT
Start: 2022-07-24 | End: 2022-07-24 | Stop reason: SDUPTHER

## 2022-07-24 RX ORDER — HYDRALAZINE HYDROCHLORIDE 25 MG/1
25 TABLET, FILM COATED ORAL EVERY 8 HOURS SCHEDULED
Qty: 90 TABLET | Refills: 0 | Status: SHIPPED | OUTPATIENT
Start: 2022-07-24

## 2022-07-24 RX ORDER — ASPIRIN 81 MG/1
81 TABLET, CHEWABLE ORAL DAILY
Qty: 30 TABLET | Refills: 0 | Status: SHIPPED | OUTPATIENT
Start: 2022-07-25 | End: 2022-07-24 | Stop reason: SDUPTHER

## 2022-07-24 RX ORDER — ATORVASTATIN CALCIUM 40 MG/1
40 TABLET, FILM COATED ORAL NIGHTLY
Qty: 30 TABLET | Refills: 0 | Status: SHIPPED | OUTPATIENT
Start: 2022-07-24 | End: 2022-07-24 | Stop reason: SDUPTHER

## 2022-07-24 RX ORDER — ATORVASTATIN CALCIUM 40 MG/1
40 TABLET, FILM COATED ORAL NIGHTLY
Qty: 30 TABLET | Refills: 0 | Status: SHIPPED | OUTPATIENT
Start: 2022-07-24

## 2022-07-24 RX ORDER — ASPIRIN 81 MG/1
81 TABLET, CHEWABLE ORAL DAILY
Qty: 30 TABLET | Refills: 0 | Status: ON HOLD | OUTPATIENT
Start: 2022-07-25 | End: 2022-08-25 | Stop reason: HOSPADM

## 2022-07-24 RX ADMIN — Medication 38 MILLICURIE: at 09:30

## 2022-07-24 RX ADMIN — Medication 12 MILLICURIE: at 08:07

## 2022-07-24 RX ADMIN — ENOXAPARIN SODIUM 40 MG: 100 INJECTION SUBCUTANEOUS at 13:24

## 2022-07-24 RX ADMIN — Medication 10 ML: at 13:33

## 2022-07-24 RX ADMIN — AMLODIPINE BESYLATE 5 MG: 10 TABLET ORAL at 13:23

## 2022-07-24 RX ADMIN — REGADENOSON 0.4 MG: 0.08 INJECTION, SOLUTION INTRAVENOUS at 09:29

## 2022-07-24 RX ADMIN — HYDRALAZINE HYDROCHLORIDE 25 MG: 25 TABLET, FILM COATED ORAL at 06:16

## 2022-07-24 RX ADMIN — LEVOTHYROXINE SODIUM 50 MCG: 0.05 TABLET ORAL at 06:16

## 2022-07-24 RX ADMIN — ASPIRIN 81 MG CHEWABLE TABLET 81 MG: 81 TABLET CHEWABLE at 13:23

## 2022-07-24 NOTE — PROGRESS NOTES
Hospitalist Progress Note      Synopsis: Patient admitted for chest pain, YAJAIRA, and hypertensive urgency. Patient presented to the ED with complaints of chest pressure and shortness of breath. He also endorsed generalized body aches, diaphoresis, and \"brain fog. \"  He is concerned for Lyme disease as he notes a rash on his shoulder/neck. He has no known tick bite. In ED he was found to be significantly hypertensive-203/88. He admits to noncompliance with his blood pressure medications. Cardiology was consulted who plans for stress test today. Hospital day 0     Subjective:  Stable overnight. Pt refusing PRN antihypertensives intermittently. He is also refusing CTA. Patient seen and examined s/p stress test. Reports that the symptoms prompting his presentation have resolved and have no returned. Feels good. No complaints. Anxious for discharge. Records reviewed. Temp (24hrs), Av.6 °F (37 °C), Min:98.3 °F (36.8 °C), Max:99.2 °F (37.3 °C)    DIET: Diet NPO Exceptions are: Sips of Water with Meds  CODE: Full Code  No intake or output data in the 24 hours ending 22 0804    Review of Systems: All bolded are positive; please see HPI  General:  Fever, chills, diaphoresis, fatigue, malaise, night sweats, weight loss  Psychological:  Anxiety, disorientation, hallucinations. ENT:  Epistaxis, headaches, vertigo, visual changes. Cardiovascular:  Chest pain, irregular heartbeats, palpitations, paroxysmal nocturnal dyspnea. Respiratory:  Shortness of breath, coughing, sputum production, hemoptysis, wheezing, orthopnea.   Gastrointestinal:  Nausea, vomiting, diarrhea, heartburn, constipation, abdominal pain, hematemesis, hematochezia, melena, acholic stools  Genito-Urinary:  Dysuria, urgency, frequency, hematuria  Musculoskeletal:  Joint pain, joint stiffness, joint swelling, muscle pain  Neurology:  Headache, focal neurological deficits, weakness, numbness, paresthesia  Derm:  Rashes, ulcers, excoriations, bruising  Extremities:  Decreased ROM, peripheral edema, mottling    Objective:    BP (!) 171/99   Pulse 56   Temp 98.6 °F (37 °C) (Temporal)   Resp 17   Ht 5' 8\" (1.727 m)   Wt 165 lb (74.8 kg)   SpO2 100%   BMI 25.09 kg/m²     General appearance: Elderly male in no apparent distress, appears stated age and cooperative. HEENT: Conjunctivae/corneas clear. Mucous membranes moist.  Neck: Supple. No JVD. Respiratory:  Clear to auscultation bilaterally. Normal respiratory effort. Cardiovascular:  RRR. S1, S2 without MRG. PV: Pulses palpable. No edema. Abdomen: Soft, non-tender, non-distended. +BS  Musculoskeletal: No obvious deformities. Skin: Normal skin color. No rashes or lesions. Good turgor. Neurologic:  Grossly non-focal. Awake, alert, following commands.    Psychiatric: Alert and oriented, thought content appropriate, normal insight and judgement    Medications:  REVIEWED DAILY    Infusion Medications    sodium chloride      sodium chloride 75 mL/hr at 07/23/22 2053     Scheduled Medications    finasteride  5 mg Oral Daily    levothyroxine  50 mcg Oral Daily    sodium chloride flush  5-40 mL IntraVENous 2 times per day    aspirin  81 mg Oral Daily    atorvastatin  40 mg Oral Nightly    enoxaparin  40 mg SubCUTAneous Daily    hydrALAZINE  25 mg Oral 3 times per day    amLODIPine  5 mg Oral Daily     PRN Meds: sodium chloride flush, sodium chloride, ondansetron **OR** ondansetron, acetaminophen **OR** acetaminophen, polyethylene glycol, aluminum & magnesium hydroxide-simethicone, nitroGLYCERIN, iopamidol, perflutren lipid microspheres, regadenoson, hydrALAZINE    Labs:     Recent Labs     07/22/22  1838 07/24/22  0536   WBC 5.4 4.4*   HGB 13.4 13.6   HCT 40.8 40.5    177       Recent Labs     07/22/22  1838 07/23/22  0605 07/24/22  0536    140 139   K 3.9 4.6 3.7    104 107   CO2 27 27 21*   BUN 23 22 19   CREATININE 1.4* 1.4* 1.3*   CALCIUM 9.1 9.4 8.9

## 2022-07-24 NOTE — PROGRESS NOTES
Pt bp elevated at 184/96. Attempted to administer PRN Hydralazine 10mg IV, pt refused d/t concerns of previous dose, given at 2038, being too close and damaging his kidneys. Explained to pt the consequences of high bp on his kidneys and difference of taking PO medications vs IV. Pt still refusing bp med.

## 2022-07-24 NOTE — DISCHARGE INSTR - DIET

## 2022-07-24 NOTE — PROGRESS NOTES
Oral Daily PRN Luis Antonio Headley MD        aspirin chewable tablet 81 mg  81 mg Oral Daily Luis Antonio Headley MD        atorvastatin (LIPITOR) tablet 40 mg  40 mg Oral Nightly Luis Antonio Headley MD        aluminum & magnesium hydroxide-simethicone (MAALOX) 200-200-20 MG/5ML suspension 30 mL  30 mL Oral Q6H PRN Luis Antonio Headley MD        enoxaparin (LOVENOX) injection 40 mg  40 mg SubCUTAneous Daily Luis Antonio Headley MD   40 mg at 07/23/22 0930    nitroGLYCERIN (NITROSTAT) SL tablet 0.4 mg  0.4 mg SubLINGual Q5 Min PRN Luis Antonio Headley MD        iopamidol (ISOVUE-370) 76 % injection 60 mL  60 mL IntraVENous ONCE PRN Bronson Tejada MD        0.9 % sodium chloride infusion   IntraVENous Continuous MATEO Delgado NP 75 mL/hr at 07/23/22 2053 New Bag at 07/23/22 2053    perflutren lipid microspheres (DEFINITY) injection 1.65 mg  1.5 mL IntraVENous ONCE PRN MATEO Cai CNP        regadenoson (LEXISCAN) injection 0.4 mg  0.4 mg IntraVENous ONCE PRN MATEO Cai CNP        hydrALAZINE (APRESOLINE) tablet 25 mg  25 mg Oral 3 times per day MATEO Cai CNP   25 mg at 07/24/22 0616    amLODIPine (NORVASC) tablet 5 mg  5 mg Oral Daily Bard Temo MD   5 mg at 07/23/22 1512    hydrALAZINE (APRESOLINE) injection 10 mg  10 mg IntraVENous Q6H PRN Bard Temo MD          sodium chloride      sodium chloride 75 mL/hr at 07/23/22 2053       Physical Exam:  BP (!) 171/99   Pulse 56   Temp 98.6 °F (37 °C) (Temporal)   Resp 17   Ht 5' 8\" (1.727 m)   Wt 165 lb (74.8 kg)   SpO2 100%   BMI 25.09 kg/m²   Wt Readings from Last 3 Encounters:   07/22/22 165 lb (74.8 kg)   03/30/17 176 lb (79.8 kg)   02/22/17 170 lb (77.1 kg)     Appearance: Awake, alert, no acute respiratory distress  Skin: Intact, no rash  Head: Normocephalic, atraumatic  Eyes: EOMI, no conjunctival erythema  ENMT: No pharyngeal erythema, MMM, no rhinorrhea  Neck: Supple, no elevated JVP, no carotid bruits  Lungs: Clear to auscultation bilaterally. No wheezes, rales, or rhonchi. Cardiac: Regular rate and rhythm, +S1S2, no murmurs apparent  Abdomen: Soft, nontender, +bowel sounds  Extremities: Moves all extremities x 4, no lower extremity edema  Neurologic: No focal motor deficits apparent, normal mood and affect  Peripheral Pulses: Intact posterior tibial pulses bilaterally    Intake/Output:  No intake or output data in the 24 hours ending 07/24/22 0933  No intake/output data recorded.     Laboratory Tests:  Recent Labs     07/22/22 1838 07/23/22  0605 07/24/22  0536    140 139   K 3.9 4.6 3.7    104 107   CO2 27 27 21*   BUN 23 22 19   CREATININE 1.4* 1.4* 1.3*   GLUCOSE 93 93 85   CALCIUM 9.1 9.4 8.9     No results found for: MG  Recent Labs     07/22/22 1838   ALKPHOS 106   ALT 16   AST 29   PROT 7.0   BILITOT 0.4   LABALBU 4.4     Recent Labs     07/22/22 1838 07/24/22  0536   WBC 5.4 4.4*   RBC 4.41 4.53   HGB 13.4 13.6   HCT 40.8 40.5   MCV 92.5 89.4   MCH 30.4 30.0   MCHC 32.8 33.6   RDW 13.1 13.1    177   MPV 9.7 9.7     Lab Results   Component Value Date    TROPONINI <0.01 02/22/2017     No results found for: INR, PROTIME  Lab Results   Component Value Date    TSH 7.350 (H) 07/23/2022     No results found for: LABA1C  No results found for: EAG  Lab Results   Component Value Date    CHOL 236 (H) 07/23/2022     Lab Results   Component Value Date    TRIG 58 07/23/2022     Lab Results   Component Value Date    HDL 81 07/23/2022     Lab Results   Component Value Date    LDLCALC 143 (H) 07/23/2022     Lab Results   Component Value Date    LABVLDL 12 07/23/2022     No results found for: [address]    Cardiac Tests:  Telemetry findings reviewed: SB at rate of 50s , no new tachy/bradyarrhythmias overnight     EKG-sinus bradycardia Marked anterior infarct age undetermined     Labs and vitals were reviewed: /82, HR 56, creat 1.3    TTE-7/23/22:   Left ventricle size is normal.   Ejection fraction is visually estimated at 50-55%. Overall ejection fraction is low normal .   No regional wall motion abnormalities seen. Moderate concentric left ventricular hypertrophy. Stage I diastolic dysfunction. Mildly dilated right ventricle. Right ventricle global systolic function is normal . TAPSE 26 mm. Mild mitral regurgitation is present. No hemodynamically significant aortic stenosis is present. Physiologic and/or trace tricuspid regurgitation. RVSP is 26 mmHg. Normal estimated PA systolic pressure. No evidence for hemodynamically significant pericardial effusion. Assessment:  Exertional dyspnea with abnormal EKG rule out ischemia and also heart failure both systolic and diastolic heart failure  Hypertension poorly controlled, current /82  Hyperlipidemia, ASVD score 35%, not on statins  Sinus bradycardia  Hypothyroidism on HRT TSH is elevated  BPH, off Flomax  Elevated creatinine level suggestive of CKD but no baseline creatinine available. Plan: Today, Lupcarmen Riley nuclear stress test rule out ischemia, if the stress test comes back normal then he is stable for discharge from the cardiology standpoint. Echo to assess LV function and rule out valvular heart disease reviewed, EF borderline normal.   Continue Norvasc 5  mg p.o. daily, will avoid beta-blockers due to underlying bradycardia. Continue  hydralazine 25 mg p.o. 3 times a day for better blood pressure control. We will hold ACE inhibitor's at this time. Add IV hydralazine as needed for poorly controlled BP  Advised cardiac diet and restrict daily salt to less than 2 g  Okay to continue aspirin 81 mg p.o. daily and Lipitor 40 mg p.o. daily  Adjust's Synthroid dose due to increased TSH level  Further recommendation pending above. He needs to follow up with PCP in one week. Tiffany Alexis MD., [facility].   [facility]) Cardiology

## 2022-07-24 NOTE — PROCEDURES
[facility] Nuclear Stress Test:    Cardiologist: Dr. Kristyn Purcell EKG: NSR, septal infarct, age undetermined, abnormal EKG. Indications for study: Chest pain    1. No chest pain  2. No new arrhythmias  3. No EKG changes suggestive of stress induced ischemia  4. Nuclear images pending    Carlin Goodpasture, MD., [facility].    [facility]) Cardiology

## 2022-07-25 LAB
EKG ATRIAL RATE: 61 BPM
EKG P AXIS: 49 DEGREES
EKG P-R INTERVAL: 172 MS
EKG Q-T INTERVAL: 414 MS
EKG QRS DURATION: 84 MS
EKG QTC CALCULATION (BAZETT): 416 MS
EKG R AXIS: -15 DEGREES
EKG T AXIS: 13 DEGREES
EKG VENTRICULAR RATE: 61 BPM

## 2022-07-25 NOTE — PROGRESS NOTES
Discharged to home goals met. Discharge instructions given verbalized an understanding. Monitor removed and place in nurses station.

## 2022-07-26 LAB — LYME, EIA: 0.49 LIV (ref 0–1.2)

## 2022-08-02 ENCOUNTER — TELEPHONE (OUTPATIENT)
Dept: CARDIOLOGY CLINIC | Age: 77
End: 2022-08-02

## 2022-08-09 NOTE — TELEPHONE ENCOUNTER
Patient notified of Dr. Jer Singer recommendation. Patient is only having some minor fatigue and he will follow up with his PCP at this point. He will call if develops any cardiac symptoms.

## 2022-08-22 ENCOUNTER — HOSPITAL ENCOUNTER (INPATIENT)
Age: 77
LOS: 3 days | Discharge: HOME | DRG: 378 | End: 2022-08-25
Attending: EMERGENCY MEDICINE | Admitting: INTERNAL MEDICINE
Payer: OTHER GOVERNMENT

## 2022-08-22 ENCOUNTER — APPOINTMENT (OUTPATIENT)
Dept: CT IMAGING | Age: 77
DRG: 378 | End: 2022-08-22
Payer: OTHER GOVERNMENT

## 2022-08-22 DIAGNOSIS — K92.1 MELENA: ICD-10-CM

## 2022-08-22 DIAGNOSIS — R52 PAIN: ICD-10-CM

## 2022-08-22 DIAGNOSIS — K92.2 GASTROINTESTINAL HEMORRHAGE, UNSPECIFIED GASTROINTESTINAL HEMORRHAGE TYPE: Primary | ICD-10-CM

## 2022-08-22 LAB
ALBUMIN SERPL-MCNC: 4.1 G/DL (ref 3.5–5.2)
ALP BLD-CCNC: 91 U/L (ref 40–129)
ALT SERPL-CCNC: 14 U/L (ref 0–40)
ANION GAP SERPL CALCULATED.3IONS-SCNC: 6 MMOL/L (ref 7–16)
AST SERPL-CCNC: 27 U/L (ref 0–39)
BASOPHILS ABSOLUTE: 0.02 E9/L (ref 0–0.2)
BASOPHILS RELATIVE PERCENT: 0.4 % (ref 0–2)
BILIRUB SERPL-MCNC: 0.3 MG/DL (ref 0–1.2)
BUN BLDV-MCNC: 32 MG/DL (ref 6–23)
CALCIUM SERPL-MCNC: 8.7 MG/DL (ref 8.6–10.2)
CHLORIDE BLD-SCNC: 105 MMOL/L (ref 98–107)
CO2: 28 MMOL/L (ref 22–29)
CREAT SERPL-MCNC: 1.3 MG/DL (ref 0.7–1.2)
EOSINOPHILS ABSOLUTE: 0.22 E9/L (ref 0.05–0.5)
EOSINOPHILS RELATIVE PERCENT: 4.7 % (ref 0–6)
GFR AFRICAN AMERICAN: >60
GFR NON-AFRICAN AMERICAN: >60 ML/MIN/1.73
GLUCOSE BLD-MCNC: 88 MG/DL (ref 74–99)
HCT VFR BLD CALC: 36.9 % (ref 37–54)
HEMOGLOBIN: 12 G/DL (ref 12.5–16.5)
IMMATURE GRANULOCYTES #: 0.01 E9/L
IMMATURE GRANULOCYTES %: 0.2 % (ref 0–5)
LACTIC ACID: 1.2 MMOL/L (ref 0.5–2.2)
LIPASE: 34 U/L (ref 13–60)
LYMPHOCYTES ABSOLUTE: 1.1 E9/L (ref 1.5–4)
LYMPHOCYTES RELATIVE PERCENT: 23.5 % (ref 20–42)
MCH RBC QN AUTO: 30.5 PG (ref 26–35)
MCHC RBC AUTO-ENTMCNC: 32.5 % (ref 32–34.5)
MCV RBC AUTO: 93.9 FL (ref 80–99.9)
MONOCYTES ABSOLUTE: 0.43 E9/L (ref 0.1–0.95)
MONOCYTES RELATIVE PERCENT: 9.2 % (ref 2–12)
NEUTROPHILS ABSOLUTE: 2.91 E9/L (ref 1.8–7.3)
NEUTROPHILS RELATIVE PERCENT: 62 % (ref 43–80)
PDW BLD-RTO: 13.1 FL (ref 11.5–15)
PLATELET # BLD: 187 E9/L (ref 130–450)
PMV BLD AUTO: 9.8 FL (ref 7–12)
POTASSIUM REFLEX MAGNESIUM: 4.7 MMOL/L (ref 3.5–5)
RBC # BLD: 3.93 E12/L (ref 3.8–5.8)
SODIUM BLD-SCNC: 139 MMOL/L (ref 132–146)
TOTAL PROTEIN: 6.3 G/DL (ref 6.4–8.3)
TROPONIN, HIGH SENSITIVITY: 17 NG/L (ref 0–11)
WBC # BLD: 4.7 E9/L (ref 4.5–11.5)

## 2022-08-22 PROCEDURE — 36415 COLL VENOUS BLD VENIPUNCTURE: CPT

## 2022-08-22 PROCEDURE — C9113 INJ PANTOPRAZOLE SODIUM, VIA: HCPCS

## 2022-08-22 PROCEDURE — 83605 ASSAY OF LACTIC ACID: CPT

## 2022-08-22 PROCEDURE — 84484 ASSAY OF TROPONIN QUANT: CPT

## 2022-08-22 PROCEDURE — 82746 ASSAY OF FOLIC ACID SERUM: CPT

## 2022-08-22 PROCEDURE — 80053 COMPREHEN METABOLIC PANEL: CPT

## 2022-08-22 PROCEDURE — 6360000002 HC RX W HCPCS

## 2022-08-22 PROCEDURE — 74177 CT ABD & PELVIS W/CONTRAST: CPT

## 2022-08-22 PROCEDURE — 93005 ELECTROCARDIOGRAM TRACING: CPT

## 2022-08-22 PROCEDURE — 99285 EMERGENCY DEPT VISIT HI MDM: CPT

## 2022-08-22 PROCEDURE — 83540 ASSAY OF IRON: CPT

## 2022-08-22 PROCEDURE — 82728 ASSAY OF FERRITIN: CPT

## 2022-08-22 PROCEDURE — 82607 VITAMIN B-12: CPT

## 2022-08-22 PROCEDURE — 85025 COMPLETE CBC W/AUTO DIFF WBC: CPT

## 2022-08-22 PROCEDURE — 83690 ASSAY OF LIPASE: CPT

## 2022-08-22 PROCEDURE — 6360000004 HC RX CONTRAST MEDICATION: Performed by: RADIOLOGY

## 2022-08-22 PROCEDURE — 96374 THER/PROPH/DIAG INJ IV PUSH: CPT

## 2022-08-22 PROCEDURE — 2060000000 HC ICU INTERMEDIATE R&B

## 2022-08-22 PROCEDURE — 83550 IRON BINDING TEST: CPT

## 2022-08-22 RX ORDER — PANTOPRAZOLE SODIUM 40 MG/10ML
80 INJECTION, POWDER, LYOPHILIZED, FOR SOLUTION INTRAVENOUS ONCE
Status: COMPLETED | OUTPATIENT
Start: 2022-08-22 | End: 2022-08-22

## 2022-08-22 RX ORDER — PANTOPRAZOLE SODIUM 40 MG/10ML
40 INJECTION, POWDER, LYOPHILIZED, FOR SOLUTION INTRAVENOUS ONCE
Status: DISCONTINUED | OUTPATIENT
Start: 2022-08-22 | End: 2022-08-22

## 2022-08-22 RX ADMIN — PANTOPRAZOLE SODIUM 80 MG: 40 INJECTION, POWDER, FOR SOLUTION INTRAVENOUS at 21:03

## 2022-08-22 RX ADMIN — IOPAMIDOL 50 ML: 755 INJECTION, SOLUTION INTRAVENOUS at 21:38

## 2022-08-22 ASSESSMENT — ENCOUNTER SYMPTOMS
NAUSEA: 0
ABDOMINAL PAIN: 1
ANAL BLEEDING: 1
SHORTNESS OF BREATH: 0
DIARRHEA: 1
EYES NEGATIVE: 1
VOMITING: 0

## 2022-08-22 ASSESSMENT — PAIN - FUNCTIONAL ASSESSMENT
PAIN_FUNCTIONAL_ASSESSMENT: NONE - DENIES PAIN

## 2022-08-22 NOTE — ED NOTES
Department of Emergency Medicine  FIRST PROVIDER TRIAGE NOTE             Independent MLP           8/22/22  4:27 PM EDT    Date of Encounter: 8/22/22   MRN: [id number]      HPI: Rasta Berry is a 67 y.o. male who presents to the ED for Rectal Bleeding (Noticed this morning )  Patient stated that he used the restroom this morning and had filled the toilet bowl with bright red blood. Patient states he has never had this happen before. Patient complains of upper abdominal discomfort. Denies fevers or chills. ROS: Negative for cp or sob. PE: Gen Appearance/Constitutional: alert  CV: regular rate  Pulm: CTA bilat  GI: soft and NT     Initial Plan of Care: All treatment areas with department are currently occupied. Plan to order/Initiate the following while awaiting opening in ED: labs and EKG.   Initiate Treatment-Testing, Proceed toTreatment Area When Bed Available for ED Attending/MLP to Continue Care    Electronically signed by MATEO Zhu CNP   DD: 8/22/22       MATEO Zhu CNP  08/22/22 6853

## 2022-08-23 ENCOUNTER — ANESTHESIA (OUTPATIENT)
Dept: ENDOSCOPY | Age: 77
DRG: 378 | End: 2022-08-23
Payer: OTHER GOVERNMENT

## 2022-08-23 ENCOUNTER — PREP FOR PROCEDURE (OUTPATIENT)
Dept: SURGERY | Age: 77
End: 2022-08-23

## 2022-08-23 ENCOUNTER — ANESTHESIA EVENT (OUTPATIENT)
Dept: ENDOSCOPY | Age: 77
DRG: 378 | End: 2022-08-23
Payer: OTHER GOVERNMENT

## 2022-08-23 PROBLEM — I10 PRIMARY HYPERTENSION: Status: ACTIVE | Noted: 2022-08-23

## 2022-08-23 PROBLEM — N40.0 BENIGN PROSTATIC HYPERPLASIA: Status: ACTIVE | Noted: 2022-08-23

## 2022-08-23 PROBLEM — I95.1 ORTHOSTATIC HYPOTENSION: Status: ACTIVE | Noted: 2022-08-23

## 2022-08-23 PROBLEM — R52 PAIN: Status: ACTIVE | Noted: 2022-08-22

## 2022-08-23 LAB
ALBUMIN SERPL-MCNC: 3.6 G/DL (ref 3.5–5.2)
ALP BLD-CCNC: 71 U/L (ref 40–129)
ALT SERPL-CCNC: 11 U/L (ref 0–40)
ANION GAP SERPL CALCULATED.3IONS-SCNC: 8 MMOL/L (ref 7–16)
AST SERPL-CCNC: 21 U/L (ref 0–39)
BASOPHILS ABSOLUTE: 0.03 E9/L (ref 0–0.2)
BASOPHILS RELATIVE PERCENT: 0.5 % (ref 0–2)
BILIRUB SERPL-MCNC: 0.5 MG/DL (ref 0–1.2)
BUN BLDV-MCNC: 36 MG/DL (ref 6–23)
CALCIUM SERPL-MCNC: 8.4 MG/DL (ref 8.6–10.2)
CHLORIDE BLD-SCNC: 107 MMOL/L (ref 98–107)
CO2: 22 MMOL/L (ref 22–29)
CREAT SERPL-MCNC: 1.1 MG/DL (ref 0.7–1.2)
EKG ATRIAL RATE: 57 BPM
EKG P AXIS: 60 DEGREES
EKG P-R INTERVAL: 190 MS
EKG Q-T INTERVAL: 422 MS
EKG QRS DURATION: 82 MS
EKG QTC CALCULATION (BAZETT): 410 MS
EKG R AXIS: 31 DEGREES
EKG T AXIS: 28 DEGREES
EKG VENTRICULAR RATE: 57 BPM
EOSINOPHILS ABSOLUTE: 0.21 E9/L (ref 0.05–0.5)
EOSINOPHILS RELATIVE PERCENT: 3.4 % (ref 0–6)
FERRITIN: 73 NG/ML
FOLATE: 16.9 NG/ML (ref 4.8–24.2)
GFR AFRICAN AMERICAN: >60
GFR NON-AFRICAN AMERICAN: >60 ML/MIN/1.73
GLUCOSE BLD-MCNC: 106 MG/DL (ref 74–99)
HCT VFR BLD CALC: 28.1 % (ref 37–54)
HCT VFR BLD CALC: 29 % (ref 37–54)
HCT VFR BLD CALC: 29.3 % (ref 37–54)
HEMOGLOBIN: 9.3 G/DL (ref 12.5–16.5)
HEMOGLOBIN: 9.4 G/DL (ref 12.5–16.5)
HEMOGLOBIN: 9.8 G/DL (ref 12.5–16.5)
IMMATURE GRANULOCYTES #: 0.02 E9/L
IMMATURE GRANULOCYTES %: 0.3 % (ref 0–5)
IRON SATURATION: 22 % (ref 20–55)
IRON: 61 MCG/DL (ref 59–158)
LACTIC ACID: 0.7 MMOL/L (ref 0.5–2.2)
LYMPHOCYTES ABSOLUTE: 1.12 E9/L (ref 1.5–4)
LYMPHOCYTES RELATIVE PERCENT: 18.1 % (ref 20–42)
MCH RBC QN AUTO: 30.2 PG (ref 26–35)
MCHC RBC AUTO-ENTMCNC: 33.4 % (ref 32–34.5)
MCV RBC AUTO: 90.2 FL (ref 80–99.9)
MONOCYTES ABSOLUTE: 0.39 E9/L (ref 0.1–0.95)
MONOCYTES RELATIVE PERCENT: 6.3 % (ref 2–12)
NEUTROPHILS ABSOLUTE: 4.42 E9/L (ref 1.8–7.3)
NEUTROPHILS RELATIVE PERCENT: 71.4 % (ref 43–80)
PDW BLD-RTO: 13.3 FL (ref 11.5–15)
PLATELET # BLD: 156 E9/L (ref 130–450)
PMV BLD AUTO: 9.8 FL (ref 7–12)
POTASSIUM REFLEX MAGNESIUM: 4 MMOL/L (ref 3.5–5)
RBC # BLD: 3.25 E12/L (ref 3.8–5.8)
SODIUM BLD-SCNC: 137 MMOL/L (ref 132–146)
TOTAL IRON BINDING CAPACITY: 281 MCG/DL (ref 250–450)
TOTAL PROTEIN: 5.6 G/DL (ref 6.4–8.3)
VITAMIN B-12: >2000 PG/ML (ref 211–946)
WBC # BLD: 6.2 E9/L (ref 4.5–11.5)

## 2022-08-23 PROCEDURE — 2580000003 HC RX 258: Performed by: SURGERY

## 2022-08-23 PROCEDURE — C9113 INJ PANTOPRAZOLE SODIUM, VIA: HCPCS | Performed by: SURGERY

## 2022-08-23 PROCEDURE — 99223 1ST HOSP IP/OBS HIGH 75: CPT | Performed by: INTERNAL MEDICINE

## 2022-08-23 PROCEDURE — C9113 INJ PANTOPRAZOLE SODIUM, VIA: HCPCS | Performed by: INTERNAL MEDICINE

## 2022-08-23 PROCEDURE — 83605 ASSAY OF LACTIC ACID: CPT

## 2022-08-23 PROCEDURE — 99222 1ST HOSP IP/OBS MODERATE 55: CPT | Performed by: SURGERY

## 2022-08-23 PROCEDURE — 36415 COLL VENOUS BLD VENIPUNCTURE: CPT

## 2022-08-23 PROCEDURE — 7100000011 HC PHASE II RECOVERY - ADDTL 15 MIN: Performed by: SURGERY

## 2022-08-23 PROCEDURE — 2060000000 HC ICU INTERMEDIATE R&B

## 2022-08-23 PROCEDURE — 2580000003 HC RX 258: Performed by: INTERNAL MEDICINE

## 2022-08-23 PROCEDURE — 85014 HEMATOCRIT: CPT

## 2022-08-23 PROCEDURE — 3609012400 HC EGD TRANSORAL BIOPSY SINGLE/MULTIPLE: Performed by: SURGERY

## 2022-08-23 PROCEDURE — 85018 HEMOGLOBIN: CPT

## 2022-08-23 PROCEDURE — 80053 COMPREHEN METABOLIC PANEL: CPT

## 2022-08-23 PROCEDURE — 3700000001 HC ADD 15 MINUTES (ANESTHESIA): Performed by: SURGERY

## 2022-08-23 PROCEDURE — 88305 TISSUE EXAM BY PATHOLOGIST: CPT

## 2022-08-23 PROCEDURE — 0DB68ZX EXCISION OF STOMACH, VIA NATURAL OR ARTIFICIAL OPENING ENDOSCOPIC, DIAGNOSTIC: ICD-10-PCS | Performed by: SURGERY

## 2022-08-23 PROCEDURE — 2709999900 HC NON-CHARGEABLE SUPPLY: Performed by: SURGERY

## 2022-08-23 PROCEDURE — 85025 COMPLETE CBC W/AUTO DIFF WBC: CPT

## 2022-08-23 PROCEDURE — 6360000002 HC RX W HCPCS: Performed by: INTERNAL MEDICINE

## 2022-08-23 PROCEDURE — 7100000010 HC PHASE II RECOVERY - FIRST 15 MIN: Performed by: SURGERY

## 2022-08-23 PROCEDURE — 43239 EGD BIOPSY SINGLE/MULTIPLE: CPT | Performed by: SURGERY

## 2022-08-23 PROCEDURE — 6360000002 HC RX W HCPCS: Performed by: SURGERY

## 2022-08-23 PROCEDURE — 6360000002 HC RX W HCPCS: Performed by: NURSE ANESTHETIST, CERTIFIED REGISTERED

## 2022-08-23 PROCEDURE — 3700000000 HC ANESTHESIA ATTENDED CARE: Performed by: SURGERY

## 2022-08-23 PROCEDURE — 6370000000 HC RX 637 (ALT 250 FOR IP): Performed by: INTERNAL MEDICINE

## 2022-08-23 RX ORDER — SODIUM CHLORIDE 0.9 % (FLUSH) 0.9 %
5-40 SYRINGE (ML) INJECTION PRN
Status: DISCONTINUED | OUTPATIENT
Start: 2022-08-23 | End: 2022-08-25 | Stop reason: HOSPADM

## 2022-08-23 RX ORDER — SODIUM CHLORIDE 0.9 % (FLUSH) 0.9 %
5-40 SYRINGE (ML) INJECTION EVERY 12 HOURS SCHEDULED
Status: DISCONTINUED | OUTPATIENT
Start: 2022-08-23 | End: 2022-08-25 | Stop reason: HOSPADM

## 2022-08-23 RX ORDER — POLYETHYLENE GLYCOL 3350 17 G/17G
17 POWDER, FOR SOLUTION ORAL DAILY PRN
Status: DISCONTINUED | OUTPATIENT
Start: 2022-08-23 | End: 2022-08-23

## 2022-08-23 RX ORDER — PANTOPRAZOLE SODIUM 40 MG/10ML
40 INJECTION, POWDER, LYOPHILIZED, FOR SOLUTION INTRAVENOUS 2 TIMES DAILY
Status: DISCONTINUED | OUTPATIENT
Start: 2022-08-23 | End: 2022-08-25 | Stop reason: HOSPADM

## 2022-08-23 RX ORDER — SODIUM CHLORIDE 9 MG/ML
INJECTION, SOLUTION INTRAVENOUS PRN
Status: DISCONTINUED | OUTPATIENT
Start: 2022-08-23 | End: 2022-08-25 | Stop reason: HOSPADM

## 2022-08-23 RX ORDER — LEVOTHYROXINE SODIUM 0.05 MG/1
50 TABLET ORAL DAILY
Status: DISCONTINUED | OUTPATIENT
Start: 2022-08-23 | End: 2022-08-25 | Stop reason: HOSPADM

## 2022-08-23 RX ORDER — AMLODIPINE BESYLATE 10 MG/1
10 TABLET ORAL DAILY
Status: DISCONTINUED | OUTPATIENT
Start: 2022-08-23 | End: 2022-08-25 | Stop reason: HOSPADM

## 2022-08-23 RX ORDER — PROPOFOL 10 MG/ML
INJECTION, EMULSION INTRAVENOUS PRN
Status: DISCONTINUED | OUTPATIENT
Start: 2022-08-23 | End: 2022-08-23 | Stop reason: SDUPTHER

## 2022-08-23 RX ORDER — ONDANSETRON 2 MG/ML
4 INJECTION INTRAMUSCULAR; INTRAVENOUS EVERY 6 HOURS PRN
Status: DISCONTINUED | OUTPATIENT
Start: 2022-08-23 | End: 2022-08-25 | Stop reason: HOSPADM

## 2022-08-23 RX ORDER — SODIUM CHLORIDE 9 MG/ML
INJECTION, SOLUTION INTRAVENOUS CONTINUOUS
Status: DISCONTINUED | OUTPATIENT
Start: 2022-08-23 | End: 2022-08-25 | Stop reason: HOSPADM

## 2022-08-23 RX ORDER — FINASTERIDE 5 MG/1
5 TABLET, FILM COATED ORAL DAILY
Status: DISCONTINUED | OUTPATIENT
Start: 2022-08-23 | End: 2022-08-25 | Stop reason: HOSPADM

## 2022-08-23 RX ORDER — ACETAMINOPHEN 325 MG/1
650 TABLET ORAL EVERY 6 HOURS PRN
Status: DISCONTINUED | OUTPATIENT
Start: 2022-08-23 | End: 2022-08-25 | Stop reason: HOSPADM

## 2022-08-23 RX ORDER — ACETAMINOPHEN 650 MG/1
650 SUPPOSITORY RECTAL EVERY 6 HOURS PRN
Status: DISCONTINUED | OUTPATIENT
Start: 2022-08-23 | End: 2022-08-25 | Stop reason: HOSPADM

## 2022-08-23 RX ORDER — TAMSULOSIN HYDROCHLORIDE 0.4 MG/1
0.4 CAPSULE ORAL DAILY
Status: DISCONTINUED | OUTPATIENT
Start: 2022-08-23 | End: 2022-08-25 | Stop reason: HOSPADM

## 2022-08-23 RX ORDER — ONDANSETRON 4 MG/1
4 TABLET, ORALLY DISINTEGRATING ORAL EVERY 8 HOURS PRN
Status: DISCONTINUED | OUTPATIENT
Start: 2022-08-23 | End: 2022-08-25 | Stop reason: HOSPADM

## 2022-08-23 RX ADMIN — SODIUM CHLORIDE: 9 INJECTION, SOLUTION INTRAVENOUS at 03:56

## 2022-08-23 RX ADMIN — SODIUM CHLORIDE: 9 INJECTION, SOLUTION INTRAVENOUS at 12:26

## 2022-08-23 RX ADMIN — SODIUM CHLORIDE: 9 INJECTION, SOLUTION INTRAVENOUS at 10:26

## 2022-08-23 RX ADMIN — SODIUM CHLORIDE, PRESERVATIVE FREE 10 ML: 5 INJECTION INTRAVENOUS at 08:15

## 2022-08-23 RX ADMIN — SODIUM CHLORIDE, PRESERVATIVE FREE 10 ML: 5 INJECTION INTRAVENOUS at 20:29

## 2022-08-23 RX ADMIN — PANTOPRAZOLE SODIUM 40 MG: 40 INJECTION, POWDER, FOR SOLUTION INTRAVENOUS at 08:20

## 2022-08-23 RX ADMIN — PANTOPRAZOLE SODIUM 40 MG: 40 INJECTION, POWDER, FOR SOLUTION INTRAVENOUS at 20:29

## 2022-08-23 RX ADMIN — LEVOTHYROXINE SODIUM 50 MCG: 0.05 TABLET ORAL at 06:24

## 2022-08-23 RX ADMIN — PROPOFOL 150 MG: 10 INJECTION, EMULSION INTRAVENOUS at 10:30

## 2022-08-23 ASSESSMENT — PAIN SCALES - GENERAL
PAINLEVEL_OUTOF10: 0
PAINLEVEL_OUTOF10: 0

## 2022-08-23 NOTE — ANESTHESIA PRE PROCEDURE
Department of Anesthesiology  Preprocedure Note       Name:  Toya Tierney   Age:  67 y.o.  :  1945                                          MRN:  [id number]         Date:  2022      Surgeon: Eduardo Mar):  Lia Cortez MD    Procedure: Procedure(s):  EGD BIOPSY    Medications prior to admission:   Prior to Admission medications    Medication Sig Start Date End Date Taking? Authorizing Provider   aspirin 81 MG chewable tablet Take 1 tablet by mouth in the morning. 22   MATEO Rico NP   atorvastatin (LIPITOR) 40 MG tablet Take 1 tablet by mouth nightly  Patient not taking: Reported on 2022   MATEO Rico NP   hydrALAZINE (APRESOLINE) 25 MG tablet Take 1 tablet by mouth in the morning and 1 tablet at noon and 1 tablet before bedtime.  22   MATEO Rico NP   amLODIPine (NORVASC) 5 MG tablet Take 2 tablets by mouth daily  Patient not taking: Reported on 2022 4/3/17   MATEO Padilla CNP   docusate sodium (COLACE, DULCOLAX) 100 MG CAPS Take 100 mg by mouth nightly  Patient not taking: Reported on 2022 4/3/17   MATEO Padilla CNP   levothyroxine (SYNTHROID) 50 MCG tablet Take 1 tablet by mouth Daily 4/3/17   MATEO Padilla CNP   finasteride (PROSCAR) 5 MG tablet Take 5 mg by mouth daily  Patient not taking: Reported on 2022    Historical Provider, MD   tamsulosin [facility]) 0.4 MG capsule Take 1 capsule by mouth daily for 14 days 2/22/17 3/30/17  Donovan Melo PA-C       Current medications:    Current Facility-Administered Medications   Medication Dose Route Frequency Provider Last Rate Last Admin    tamsulosin (FLOMAX) capsule 0.4 mg  0.4 mg Oral Daily Jesús Hric, DO        finasteride (PROSCAR) tablet 5 mg  5 mg Oral Daily Jesús Hric, DO        amLODIPine (NORVASC) tablet 10 mg  10 mg Oral Daily Jesús Hric, DO        sodium chloride flush 0.9 % injection 5-40 mL  5-40 mL IntraVENous 2 times per day Jesús Hric, DO        sodium chloride flush 0.9 % injection 5-40 mL  5-40 mL IntraVENous PRN Jesús Hric, DO        0.9 % sodium chloride infusion   IntraVENous PRN Jesús Hric, DO        ondansetron (ZOFRAN-ODT) disintegrating tablet 4 mg  4 mg Oral Q8H PRN Jesús Hric, DO        Or    ondansetron (ZOFRAN) injection 4 mg  4 mg IntraVENous Q6H PRN Jesús Hric, DO        polyethylene glycol (GLYCOLAX) packet 17 g  17 g Oral Daily PRN Jesús Hric, DO        acetaminophen (TYLENOL) tablet 650 mg  650 mg Oral Q6H PRN Jesús Hric, DO        Or    acetaminophen (TYLENOL) suppository 650 mg  650 mg Rectal Q6H PRN Jesús Hric, DO        0.9 % sodium chloride infusion   IntraVENous Continuous Jesús Hric, DO 75 mL/hr at 08/23/22 0356 New Bag at 08/23/22 1026    pantoprazole (PROTONIX) injection 40 mg  40 mg IntraVENous BID Jesús Hric, DO   40 mg at 08/23/22 0820    levothyroxine (SYNTHROID) tablet 50 mcg  50 mcg Oral Daily Jesús Hric, DO   50 mcg at 08/23/22 6353       Allergies:  No Known Allergies    Problem List:    Patient Active Problem List   Diagnosis Code    Chest pain R07.9    GI bleed K92.2    Orthostatic hypotension I95.1    Primary hypertension I10    Benign prostatic hyperplasia N40.0       Past Medical History:        Diagnosis Date    Arthritis     Hypertension     Thyroid disease        Past Surgical History:  No past surgical history on file.     Social History:    Social History     Tobacco Use    Smoking status: Never    Smokeless tobacco: Never   Substance Use Topics    Alcohol use: Yes     Comment: occasionaly                                 Counseling given: Not Answered      Vital Signs (Current):   Vitals:    08/23/22 0030 08/23/22 0515 08/23/22 0800 08/23/22 0815   BP: (!) 162/84 132/76  (!) 151/85   Pulse: 65 56 54    Resp: 16 16  16   Temp: 97.5 °F (36.4 °C)  97.6 °F (36.4 °C)    TempSrc: Oral  Oral    SpO2: 100%   100%   Weight:       Height: BP Readings from Last 3 Encounters:   08/23/22 (!) 151/85   07/24/22 (!) 161/99   04/03/17 (!) 147/93       NPO Status: Time of last liquid consumption: 2200                        Time of last solid consumption: 1400                        Date of last liquid consumption: 08/22/22                        Date of last solid food consumption: 08/22/22    BMI:   Wt Readings from Last 3 Encounters:   08/22/22 165 lb (74.8 kg)   07/22/22 165 lb (74.8 kg)   03/30/17 176 lb (79.8 kg)     Body mass index is 25.09 kg/m². CBC:   Lab Results   Component Value Date/Time    WBC 6.2 08/23/2022 03:55 AM    RBC 3.25 08/23/2022 03:55 AM    HGB 9.8 08/23/2022 03:55 AM    HCT 29.3 08/23/2022 03:55 AM    MCV 90.2 08/23/2022 03:55 AM    RDW 13.3 08/23/2022 03:55 AM     08/23/2022 03:55 AM       CMP:   Lab Results   Component Value Date/Time     08/23/2022 03:55 AM    K 4.0 08/23/2022 03:55 AM     08/23/2022 03:55 AM    CO2 22 08/23/2022 03:55 AM    BUN 36 08/23/2022 03:55 AM    CREATININE 1.1 08/23/2022 03:55 AM    GFRAA >60 08/23/2022 03:55 AM    LABGLOM >60 08/23/2022 03:55 AM    GLUCOSE 106 08/23/2022 03:55 AM    PROT 5.6 08/23/2022 03:55 AM    CALCIUM 8.4 08/23/2022 03:55 AM    BILITOT 0.5 08/23/2022 03:55 AM    ALKPHOS 71 08/23/2022 03:55 AM    AST 21 08/23/2022 03:55 AM    ALT 11 08/23/2022 03:55 AM       POC Tests: No results for input(s): POCGLU, POCNA, POCK, POCCL, POCBUN, POCHEMO, POCHCT in the last 72 hours.     Coags: No results found for: PROTIME, INR, APTT    HCG (If Applicable): No results found for: PREGTESTUR, PREGSERUM, HCG, HCGQUANT     ABGs: No results found for: PHART, PO2ART, CZY0HII, LVS0CDP, BEART, A0HBRCTR     Type & Screen (If Applicable):  No results found for: LABABO, LABRH    Drug/Infectious Status (If Applicable):  No results found for: HIV, HEPCAB    COVID-19 Screening (If Applicable):   Lab Results   Component Value Date/Time    COVID19 Not Detected 07/22/2022 06:38 PM           Anesthesia Evaluation  Patient summary reviewed no history of anesthetic complications:   Airway: Mallampati: III  TM distance: >3 FB   Neck ROM: full  Mouth opening: > = 3 FB   Dental:          Pulmonary: breath sounds clear to auscultation                             Cardiovascular:    (+) hypertension: moderate, angina:,       NYHA Classification: II  ECG reviewed  Rhythm: regular  Rate: normal                    Neuro/Psych:               GI/Hepatic/Renal:   (+) PUD,           Endo/Other:    (+) blood dyscrasia: arthritis: OA and no interval change. , . Abdominal:       Abdomen: soft. Vascular: Other Findings:           Anesthesia Plan      MAC     ASA 3       Induction: intravenous. MIPS: Postoperative opioids intended. Anesthetic plan and risks discussed with patient and spouse. Plan discussed with CRNA.     Attending anesthesiologist reviewed and agrees with Laura Martin MD   8/23/2022

## 2022-08-23 NOTE — ANESTHESIA POSTPROCEDURE EVALUATION
Department of Anesthesiology  Postprocedure Note    Patient: Sridevi Lopez  MRN: [id number]  YOB: 1945  Date of evaluation: 8/23/2022      Procedure Summary     Date: 08/23/22 Room / Location: [place] [address] / [facility]    Anesthesia Start: 1026 Anesthesia Stop: [address] [facility]    Procedure: EGD BIOPSY Diagnosis:       Melena      (MELANA)    Surgeons: Glen Ricardo MD Responsible Provider: Maggy Gordon MD    Anesthesia Type: MAC ASA Status: 3          Anesthesia Type: MAC    Laxmi Phase I:      Laxmi Phase II: Laxmi Score: 10      Anesthesia Post Evaluation    Patient location during evaluation: PACU  Level of consciousness: awake and alert  Pain score: 2  Airway patency: patent  Cardiovascular status: blood pressure returned to baseline and hemodynamically stable  Respiratory status: acceptable  Hydration status: euvolemic

## 2022-08-23 NOTE — PROGRESS NOTES
Admission database completed to the best of this RN's ability.  Medications reviewed with patient at bedside

## 2022-08-23 NOTE — PROGRESS NOTES
[facility] Progress Note    --------------------------------------------------------------------------------------  Assessment  Anemia d/t GIB  YJAAIRA resolved  Hx HTN, hypothyroid    Plan  EGD some superficial antral ulcerations, no active bleed  For colonoscopy  Pt declines any blood products so feel not as important to watch hgb  Please see orders for further plan of care    Code status  Full  DVT prophylaxis Held  Disposition  To be determined  --------------------------------------------------------------------------------------    Admission Date  8/22/2022  6:54 PM  Chief Complaint Rectal bleeding    Subjective  History of Present Illness  76M PMH HTN, hypothyroid, arthritis who presented to ED 8/22 with rectal bleeding beginning that morning, starting with melena but then transitioning to multiple episodes of hematochezia. Did have hx hemorrhoids but no bleeding issues in the past.  Has never had endoscopy. Admitted to diffuse abdominal cramping. Workup showed heme+ stool, stable vitals, hgb 12.0. CT showed bladder wall thickening that looked to be related to chronic bladder outlet obstruction from prostatomegaly. Pt was given Protonix and admitted w consult to surgical service, who took pt for EGD this AM that showed some superficial gastric antral ulcerations but no ezequiel bleeding and normal duodenum.   Pt now for colonoscopy for further eval.      Review of Systems and Physical Exam already performed this calendar date by admitting physician    Electronically signed by Petra Spaulding DO on 8/23/2022 at 12:05 PM

## 2022-08-23 NOTE — ANESTHESIA POSTPROCEDURE EVALUATION
Department of Anesthesiology  Postprocedure Note    Patient: Jacque Melendez  MRN: [id number]  YOB: 1945  Date of evaluation: 8/23/2022      Procedure Summary     Date: 08/23/22 Room / Location: [place] [address] / [facility]    Anesthesia Start: 1026 Anesthesia Stop: [phone]    Procedure: EGD BIOPSY Diagnosis:       Melena      (MELANA)    Surgeons: Jaquelin Elizondo MD Responsible Provider: Alex Joiner MD    Anesthesia Type: MAC ASA Status: 3          Anesthesia Type: MAC    Laxmi Phase I:      Laxmi Phase II: Laxmi Score: 10      Anesthesia Post Evaluation    Patient location during evaluation: PACU  Level of consciousness: awake and alert  Pain score: 2  Cardiovascular status: hemodynamically stable and blood pressure returned to baseline  Respiratory status: acceptable  Hydration status: euvolemic  Multimodal analgesia pain management approach

## 2022-08-23 NOTE — CARE COORDINATION
8/23/2022  Social Work Discharge Planning:Rectal bleeding. Pt is currently out for EGD. Will see Pt at a later time. Chart screened. Pt is on room air.  Electronically signed by LOLA Duque on 8/23/2022 at 11:38 AM

## 2022-08-23 NOTE — ED PROVIDER NOTES
Minh Green is a 69-year male who presents to the emergency department for rectal bleeding that started this morning. He reports the complaint is intermittent, moderate in severity, nothing makes it better or worse. He states that he had 1 dark stool with some blood this morning and then had multiple episodes of bright red blood per rectum. He states he has had at least 6 episodes of bright red blood per rectum that he states is just blood. He states that he does have a history of hemorrhoids, but has never had blood from the rectum. He also reports some abdominal cramping and bloating. Patient is refusing blood products. He denies losing weight and changes to his appetite. He denies fever, chills, chest pain, shortness of breath, headache, changes to vision, nausea, vomiting, and hematemesis. Has never had a colonoscopy    The history is provided by the patient and the spouse. Review of Systems   Constitutional:  Negative for chills and fever. HENT: Negative. Eyes: Negative. Respiratory:  Negative for shortness of breath. Cardiovascular:  Negative for chest pain. Gastrointestinal:  Positive for abdominal pain, anal bleeding and diarrhea. Negative for nausea and vomiting. Genitourinary: Negative. Musculoskeletal:  Negative for neck pain and neck stiffness. Skin: Negative. Neurological:  Negative for dizziness, light-headedness and headaches. Psychiatric/Behavioral: Negative. Physical Exam  Constitutional:       Appearance: Normal appearance. HENT:      Head: Normocephalic and atraumatic. Eyes:      Pupils: Pupils are equal, round, and reactive to light. Cardiovascular:      Rate and Rhythm: Normal rate and regular rhythm. Pulmonary:      Effort: Pulmonary effort is normal.      Breath sounds: No wheezing, rhonchi or rales. Abdominal:      Palpations: Abdomen is soft. Tenderness: There is no abdominal tenderness. There is no guarding or rebound. Genitourinary:     Rectum: Guaiac result positive (Grossly positive, bright red blood). Comments: Enlarged prostate noted on rectal exam, possible internal hemorrhoid to the posterior  Musculoskeletal:      Cervical back: Normal range of motion. No rigidity. Skin:     General: Skin is warm and dry. Capillary Refill: Capillary refill takes less than 2 seconds. Neurological:      General: No focal deficit present. Mental Status: He is alert and oriented to person, place, and time. EKG: This EKG is signed and interpreted by me. Rate: 57  Rhythm: Sinus  Interpretation: Sinus bradycardia with sinus arrhythmia. Normal axis. No ST or T wave changes. No STEMI. Comparison: stable as compared to patient's most recent EKG      Procedures     MDM  Number of Diagnoses or Management Options  Gastrointestinal hemorrhage, unspecified gastrointestinal hemorrhage type  Diagnosis management comments: Patient presented the emergency department for GI bleed, episode of black stool this morning with blood and then multiple episodes of bright red blood per rectum. Multiple episodes of blood to the toilet bowl here in the emergency department. Hemoccult was grossly positive. Labs show that his hemoglobin has dropped 1.6 g since he was here 1 month ago. CT of abdomen was unremarkable. With the amount of blood loss and never having a colonoscopy in the past, discussed case with Dr. Carlos Colon who will admit the patient to intermediate floor. --------------------------------------------- PAST HISTORY ---------------------------------------------  Past Medical History:  has a past medical history of Arthritis, Hypertension, and Thyroid disease. Past Surgical History:  has no past surgical history on file. Social History:  reports that he has never smoked. He has never used smokeless tobacco. He reports current alcohol use. He reports that he does not use drugs.     Family History: family history is not on file. The patients home medications have been reviewed. Allergies: Patient has no known allergies.     -------------------------------------------------- RESULTS -------------------------------------------------    Lab  Results for orders placed or performed during the hospital encounter of 08/22/22   CBC with Auto Differential   Result Value Ref Range    WBC 4.7 4.5 - 11.5 E9/L    RBC 3.93 3.80 - 5.80 E12/L    Hemoglobin 12.0 (L) 12.5 - 16.5 g/dL    Hematocrit 36.9 (L) 37.0 - 54.0 %    MCV 93.9 80.0 - 99.9 fL    MCH 30.5 26.0 - 35.0 pg    MCHC 32.5 32.0 - 34.5 %    RDW 13.1 11.5 - 15.0 fL    Platelets 180 720 - 252 E9/L    MPV 9.8 7.0 - 12.0 fL    Neutrophils % 62.0 43.0 - 80.0 %    Immature Granulocytes % 0.2 0.0 - 5.0 %    Lymphocytes % 23.5 20.0 - 42.0 %    Monocytes % 9.2 2.0 - 12.0 %    Eosinophils % 4.7 0.0 - 6.0 %    Basophils % 0.4 0.0 - 2.0 %    Neutrophils Absolute 2.91 1.80 - 7.30 E9/L    Immature Granulocytes # 0.01 E9/L    Lymphocytes Absolute 1.10 (L) 1.50 - 4.00 E9/L    Monocytes Absolute 0.43 0.10 - 0.95 E9/L    Eosinophils Absolute 0.22 0.05 - 0.50 E9/L    Basophils Absolute 0.02 0.00 - 0.20 E9/L   Comprehensive Metabolic Panel w/ Reflex to MG   Result Value Ref Range    Sodium 139 132 - 146 mmol/L    Potassium reflex Magnesium 4.7 3.5 - 5.0 mmol/L    Chloride 105 98 - 107 mmol/L    CO2 28 22 - 29 mmol/L    Anion Gap 6 (L) 7 - 16 mmol/L    Glucose 88 74 - 99 mg/dL    BUN 32 (H) 6 - 23 mg/dL    Creatinine 1.3 (H) 0.7 - 1.2 mg/dL    GFR Non-African American >60 >=60 mL/min/1.73    GFR African American >60     Calcium 8.7 8.6 - 10.2 mg/dL    Total Protein 6.3 (L) 6.4 - 8.3 g/dL    Albumin 4.1 3.5 - 5.2 g/dL    Total Bilirubin 0.3 0.0 - 1.2 mg/dL    Alkaline Phosphatase 91 40 - 129 U/L    ALT 14 0 - 40 U/L    AST 27 0 - 39 U/L   Lactic Acid   Result Value Ref Range    Lactic Acid 1.2 0.5 - 2.2 mmol/L   Lipase   Result Value Ref Range    Lipase 34 13 - 60 U/L   Troponin   Result Value Ref Range    Troponin, High Sensitivity 17 (H) 0 - 11 ng/L   EKG 12 Lead   Result Value Ref Range    Ventricular Rate 57 BPM    Atrial Rate 57 BPM    P-R Interval 190 ms    QRS Duration 82 ms    Q-T Interval 422 ms    QTc Calculation (Bazett) 410 ms    P Axis 60 degrees    R Axis 31 degrees    T Axis 28 degrees       Radiology  CT ABDOMEN PELVIS W IV CONTRAST Additional Contrast? None    Result Date: 8/22/2022  EXAMINATION: CT OF THE ABDOMEN AND PELVIS WITH CONTRAST 8/22/2022 9:42 pm TECHNIQUE: CT of the abdomen and pelvis was performed with the administration of intravenous contrast. Multiplanar reformatted images are provided for review. Automated exposure control, iterative reconstruction, and/or weight based adjustment of the mA/kV was utilized to reduce the radiation dose to as low as reasonably achievable. COMPARISON: 03/30/2017 HISTORY: ORDERING SYSTEM PROVIDED HISTORY: rectal bleeding TECHNOLOGIST PROVIDED HISTORY: Additional Contrast?->None Reason for exam:->rectal bleeding Decision Support Exception - unselect if not a suspected or confirmed emergency medical condition->Emergency Medical Condition (MA) FINDINGS: Lower Chest:  Visualized portion of the lower chest demonstrates no acute abnormality. Organs: The liver, gallbladder, spleen, pancreas, adrenals, and kidneys are unremarkable. GI/Bowel: There is no evidence of bowel obstruction. No evidence of abnormal bowel wall thickening or distension. There is diverticulosis. The appendix is normal. Pelvis: There is marked circumferential bladder wall thickening. The prostate is enlarged. Peritoneum/Retroperitoneum: No evidence of ascites or free air. No evidence of retroperitoneal lymphadenopathy. Aorta is normal in caliber without acute abnormality. Bones/Soft Tissues:  No acute abnormality of the visualized osseous structures. No acute abdominopelvic abnormality.  Marked bladder wall thickening, which may be related to chronic bladder outlet obstruction given prostatomegaly. However, superimposed infection is not excluded. Recommend clinical and laboratory correlation. NM Cardiac Stress Test Nuclear Imaging    Result Date: 7/24/2022  Indication:  Chest Pain Clinical History:   Patient has no known history of coronary artery disease. Procedure:  Pharmacologic stress testing was performed with Regadenoson 0.4 mg for 15 seconds. IMAGING: Myocardial perfusion imaging was performed at rest 30-35 minutes following the intravenous injection of 12 mCi of (Tc-Sestamibi) followed by 10 ml of Normal Saline. As per infusion protocol, the patient was injected intravenously with 38 mCi of (Tc-Sestamibi) followed by 10 ml of Normal Saline. Gated post-stress tomographic imaging was performed 20-25 minutes after stress. FINDINGS: The overall quality of the study was good Left ventricular cavity size was noted to be normal. Rotational analog analysis demonstrated no patient motion, extracardiac activity or attenuation artifact. The gated SPECT stress imaging in the short, vertical long, and horizontal long axis demonstrated normal homogeneous tracer distribution throughout the myocardium. The resting images show no change. Gated SPECT left ventricular ejection fraction was calculated to be 53% with normal wall motion and thickening. TID ratio 1.02. Low-dose noncontrast chest CT used for attenuation artifact showed no coronary artery calcification, normal sized ascending thoracic aorta. 1.  ECG during the infusion did not change. 2.  The myocardial perfusion imaging was normal without ischemia or infarct. 3.  Overall left ventricular systolic function was normal without regional wall motion abnormalities. 4.  No transient ischemic dilatation. 5. Low risk general pharmacologic stress test. Thank you for sending your patient to this [employer]. Zelia Boxer, MD, [facility] cardiology.            ------------------------- NURSING NOTES AND VITALS REVIEWED ---------------------------  Date / Time Roomed:  8/22/2022  6:54 PM  ED Bed Assignment:  10/10    The nursing notes within the ED encounter and vital signs as below have been reviewed. Patient Vitals for the past 24 hrs:   BP Temp Temp src Pulse Resp SpO2 Height Weight   08/22/22 2356 (!) 167/82 98.5 °F (36.9 °C) Oral 61 16 100 % -- --   08/22/22 2342 (!) 158/85 -- -- 60 18 98 % -- --   08/22/22 2203 (!) 157/87 -- -- 58 16 99 % -- --   08/22/22 1952 (!) 190/101 -- -- 60 14 99 % -- --   08/22/22 1920 (!) 208/94 -- -- -- -- -- -- --   08/22/22 1915 (!) 194/86 -- -- 60 16 100 % -- --   08/22/22 1625 (!) 148/83 97.5 °F (36.4 °C) Temporal 55 15 100 % 5' 8\" (1.727 m) 165 lb (74.8 kg)       Oxygen Saturation Interpretation: Normal      ------------------------------------------ PROGRESS NOTES ------------------------------------------      I have spoken with the patient and discussed todays results, in addition to providing specific details for the plan of care and counseling regarding the diagnosis and prognosis. Their questions are answered at this time and they are agreeable with the plan.      --------------------------------- ADDITIONAL PROVIDER NOTES ---------------------------------  Consultations:  Spoke with Dr. Elsy Ornelas,  They will admit this patient. This patient's ED course included: a personal history and physicial examination, re-evaluation prior to disposition, cardiac monitoring, and continuous pulse oximetry    This patient has remained unchanged during their ED course. Please note that the withdrawal or failure to initiate urgent interventions for this patient would likely result in a life threatening deterioration or permanent disability. Clinical Impression  1. Gastrointestinal hemorrhage, unspecified gastrointestinal hemorrhage type          Disposition  Patient's disposition: Admit to intermediate floor  Patient's condition is fair.            Jose M Gray Marjorie Tapia, DO  Resident  08/23/22 [id number]

## 2022-08-23 NOTE — H&P
[facility]   History and Physical      CHIEF COMPLAINT:  melena    History of Present Illness: pt is a 67 y.o. male who presents to hospital for melena. Pt started to have bm's with melena starting day of coming to the ER. Pt had multiple bm's during the day(approx 6). Pt states that the bm's were dark in appearance. Pt noted pain in rectal area when he is about to have bm but no actual abdominal pain that he notes. Pt c/o recent constipation and denies n/v. Pt denies fevers, chills, diarrhea, hematochezia, change in urination, dysuria, or hematuria. Pt denies dizziness or lightheadedness. Pt noted to have continued bm's in hospital.    REVIEW OF SYSTEMS:    A detailed system review was conducted with patient and is negative unless stated in hpi. PMH:  Past Medical History:   Diagnosis Date    Arthritis     Hypertension     Thyroid disease        Surgical History:  No past surgical history on file. Medications Prior to Admission:    Prior to Admission medications    Medication Sig Start Date End Date Taking? Authorizing Provider   aspirin 81 MG chewable tablet Take 1 tablet by mouth in the morning. 7/25/22   MATEO Petit NP   atorvastatin (LIPITOR) 40 MG tablet Take 1 tablet by mouth nightly  Patient not taking: Reported on 8/23/2022 7/24/22   MATEO Petit NP   hydrALAZINE (APRESOLINE) 25 MG tablet Take 1 tablet by mouth in the morning and 1 tablet at noon and 1 tablet before bedtime.  7/24/22   MATEO Petit NP   amLODIPine (NORVASC) 5 MG tablet Take 2 tablets by mouth daily  Patient not taking: Reported on 8/23/2022 4/3/17   Conrado Madsen APRN - CNP   docusate sodium (COLACE, DULCOLAX) 100 MG CAPS Take 100 mg by mouth nightly  Patient not taking: Reported on 8/23/2022 4/3/17   Conrado [place]oma, APRN - CNP   levothyroxine (SYNTHROID) 50 MCG tablet Take 1 tablet by mouth Daily 4/3/17   Conrado Madsen APRN - CNP   finasteride (PROSCAR) 5 MG tablet 08/23/2022 03:55 AM    HGB 9.8 08/23/2022 03:55 AM    HCT 29.3 08/23/2022 03:55 AM     08/23/2022 03:55 AM    MCV 90.2 08/23/2022 03:55 AM    MCH 30.2 08/23/2022 03:55 AM    MCHC 33.4 08/23/2022 03:55 AM    RDW 13.3 08/23/2022 03:55 AM    LYMPHOPCT 18.1 08/23/2022 03:55 AM    MONOPCT 6.3 08/23/2022 03:55 AM    BASOPCT 0.5 08/23/2022 03:55 AM    MONOSABS 0.39 08/23/2022 03:55 AM    LYMPHSABS 1.12 08/23/2022 03:55 AM    EOSABS 0.21 08/23/2022 03:55 AM    BASOSABS 0.03 08/23/2022 03:55 AM     CMP:    Lab Results   Component Value Date/Time     08/23/2022 03:55 AM    K 4.0 08/23/2022 03:55 AM     08/23/2022 03:55 AM    CO2 22 08/23/2022 03:55 AM    BUN 36 08/23/2022 03:55 AM    CREATININE 1.1 08/23/2022 03:55 AM    GFRAA >60 08/23/2022 03:55 AM    LABGLOM >60 08/23/2022 03:55 AM    GLUCOSE 106 08/23/2022 03:55 AM    PROT 5.6 08/23/2022 03:55 AM    LABALBU 3.6 08/23/2022 03:55 AM    CALCIUM 8.4 08/23/2022 03:55 AM    BILITOT 0.5 08/23/2022 03:55 AM    ALKPHOS 71 08/23/2022 03:55 AM    AST 21 08/23/2022 03:55 AM    ALT 11 08/23/2022 03:55 AM       Radiology: CT ABDOMEN PELVIS W IV CONTRAST Additional Contrast? None    Result Date: 8/22/2022  EXAMINATION: CT OF THE ABDOMEN AND PELVIS WITH CONTRAST 8/22/2022 9:42 pm TECHNIQUE: CT of the abdomen and pelvis was performed with the administration of intravenous contrast. Multiplanar reformatted images are provided for review. Automated exposure control, iterative reconstruction, and/or weight based adjustment of the mA/kV was utilized to reduce the radiation dose to as low as reasonably achievable.  COMPARISON: 03/30/2017 HISTORY: ORDERING SYSTEM PROVIDED HISTORY: rectal bleeding TECHNOLOGIST PROVIDED HISTORY: Additional Contrast?->None Reason for exam:->rectal bleeding Decision Support Exception - unselect if not a suspected or confirmed emergency medical condition->Emergency Medical Condition (MA) FINDINGS: Lower Chest:  Visualized portion of the lower chest demonstrates no acute abnormality. Organs: The liver, gallbladder, spleen, pancreas, adrenals, and kidneys are unremarkable. GI/Bowel: There is no evidence of bowel obstruction. No evidence of abnormal bowel wall thickening or distension. There is diverticulosis. The appendix is normal. Pelvis: There is marked circumferential bladder wall thickening. The prostate is enlarged. Peritoneum/Retroperitoneum: No evidence of ascites or free air. No evidence of retroperitoneal lymphadenopathy. Aorta is normal in caliber without acute abnormality. Bones/Soft Tissues:  No acute abnormality of the visualized osseous structures. No acute abdominopelvic abnormality. Marked bladder wall thickening, which may be related to chronic bladder outlet obstruction given prostatomegaly. However, superimposed infection is not excluded. Recommend clinical and laboratory correlation. ASSESSMENT:      Principal Problem:    GI bleed  Resolved Problems:    * No resolved hospital problems. *      PLAN:    Gi bleed/Melena surgical consultation. Will add protonix bid. Orthostatic hypotension although flomax may contribute, concerned about ongoing hypovolemia. Monitor closely and give iv fluids  Hypovolemia iv fluids  Anemia with acute blood loss component monitor hgb. Pt does not want blood products  Htn will given norvasc and hold hydralazine at the moment.   Bph will continue flomax for now  Hypothyroidism continue med          Electronically signed by Fredo Rust DO on 8/23/2022 at 5:19 AM

## 2022-08-23 NOTE — CONSULTS
GENERAL SURGERY  CONSULT NOTE  8/23/2022    Physician Consulted: Dr. Criselda Moss  Reason for Consult: GIB  Referring Physician: Dr. Candis ALTAMIRANO  Naveed Wilcox is a 67 y.o. male who presents for evaluation of melanotic stools. Patient states he has had multiple episodes of melena starting yesterday. He has never had bleeding from his bottom before. He was also having mild crampy abdominal pain at that time. Denies any fevers, chills, chest pain, nausea or vomiting. No hx of c scope or EGD. Past Medical History:   Diagnosis Date    Arthritis     Hypertension     Thyroid disease        No past surgical history on file. Medications Prior to Admission    Prior to Admission medications    Medication Sig Start Date End Date Taking? Authorizing Provider   aspirin 81 MG chewable tablet Take 1 tablet by mouth in the morning. 7/25/22   Kvng Ponce APRN - NP   atorvastatin (LIPITOR) 40 MG tablet Take 1 tablet by mouth nightly  Patient not taking: Reported on 8/23/2022 7/24/22   Kvng Ponce APRN - NP   hydrALAZINE (APRESOLINE) 25 MG tablet Take 1 tablet by mouth in the morning and 1 tablet at noon and 1 tablet before bedtime. 7/24/22   Kvng Ponce APRN - NP   amLODIPine (NORVASC) 5 MG tablet Take 2 tablets by mouth daily  Patient not taking: Reported on 8/23/2022 4/3/17   MATEO Feliciano - CNP   docusate sodium (COLACE, DULCOLAX) 100 MG CAPS Take 100 mg by mouth nightly  Patient not taking: Reported on 8/23/2022 4/3/17   MATEO Feliciano CNP   levothyroxine (SYNTHROID) 50 MCG tablet Take 1 tablet by mouth Daily 4/3/17   MATEO Feliciano - CNP   finasteride (PROSCAR) 5 MG tablet Take 5 mg by mouth daily  Patient not taking: Reported on 8/23/2022    Historical Provider, MD   tamsulosin [facility]) 0.4 MG capsule Take 1 capsule by mouth daily for 14 days 2/22/17 3/30/17  Patricia Schwartz PA-C       No Known Allergies    No family history on file.     Social History     Tobacco Use    Smoking status: Never    Smokeless tobacco: Never   Substance Use Topics    Alcohol use: Yes     Comment: occasionaly     Drug use: No         Review of Systems: pertinent ROS listed in HPI, all others negative       PHYSICAL EXAM:    Vitals:    08/23/22 0030   BP: (!) 162/84   Pulse: 65   Resp: 16   Temp: 97.5 °F (36.4 °C)   SpO2: 100%       GENERAL:  NAD. A&Ox3. HEAD:  Normocephalic. Atraumatic. EYES:   No scleral icterus. PERRL. LUNGS:  No increased work of breathing. CARDIOVASCULAR: hemodynamically stable  ABDOMEN:  Soft, non-distended, non-tender. No guarding, rigidity, rebound. EXTREMITIES:   MAEx4. Atraumatic. No LE edema. SKIN:  Warm and dry  NEUROLOGIC: no focal neurologic deficits      ASSESSMENT/PLAN:  67 y.o. male with bright red blood per rectum.      Plan  -EGD today   -NPO, mIVF  -trend hgb, transfuse for hgb <7 or if patient becomes unstable  -maintain two large bore Ivs  -continue protonix BID   -will need a c-scope eventually    Plan discussed with Dr. Kenna Valentine MD  Surgery Resident PGY-2  8/23/2022  5:19 AM

## 2022-08-24 ENCOUNTER — ANESTHESIA EVENT (OUTPATIENT)
Dept: ENDOSCOPY | Age: 77
DRG: 378 | End: 2022-08-24
Payer: OTHER GOVERNMENT

## 2022-08-24 LAB
ALBUMIN SERPL-MCNC: 3.4 G/DL (ref 3.5–5.2)
ALP BLD-CCNC: 59 U/L (ref 40–129)
ALT SERPL-CCNC: 10 U/L (ref 0–40)
ANION GAP SERPL CALCULATED.3IONS-SCNC: 9 MMOL/L (ref 7–16)
AST SERPL-CCNC: 19 U/L (ref 0–39)
BASOPHILS ABSOLUTE: 0.02 E9/L (ref 0–0.2)
BASOPHILS RELATIVE PERCENT: 0.4 % (ref 0–2)
BILIRUB SERPL-MCNC: 0.4 MG/DL (ref 0–1.2)
BUN BLDV-MCNC: 23 MG/DL (ref 6–23)
CALCIUM SERPL-MCNC: 8.4 MG/DL (ref 8.6–10.2)
CHLORIDE BLD-SCNC: 110 MMOL/L (ref 98–107)
CO2: 22 MMOL/L (ref 22–29)
CREAT SERPL-MCNC: 1.2 MG/DL (ref 0.7–1.2)
EOSINOPHILS ABSOLUTE: 0.2 E9/L (ref 0.05–0.5)
EOSINOPHILS RELATIVE PERCENT: 4.1 % (ref 0–6)
GFR AFRICAN AMERICAN: >60
GFR NON-AFRICAN AMERICAN: >60 ML/MIN/1.73
GLUCOSE BLD-MCNC: 98 MG/DL (ref 74–99)
HCT VFR BLD CALC: 27.3 % (ref 37–54)
HCT VFR BLD CALC: 28.2 % (ref 37–54)
HEMOGLOBIN: 9 G/DL (ref 12.5–16.5)
HEMOGLOBIN: 9.3 G/DL (ref 12.5–16.5)
IMMATURE GRANULOCYTES #: 0.01 E9/L
IMMATURE GRANULOCYTES %: 0.2 % (ref 0–5)
LYMPHOCYTES ABSOLUTE: 0.98 E9/L (ref 1.5–4)
LYMPHOCYTES RELATIVE PERCENT: 20.3 % (ref 20–42)
MCH RBC QN AUTO: 29.8 PG (ref 26–35)
MCHC RBC AUTO-ENTMCNC: 33 % (ref 32–34.5)
MCV RBC AUTO: 90.4 FL (ref 80–99.9)
MONOCYTES ABSOLUTE: 0.38 E9/L (ref 0.1–0.95)
MONOCYTES RELATIVE PERCENT: 7.9 % (ref 2–12)
NEUTROPHILS ABSOLUTE: 3.24 E9/L (ref 1.8–7.3)
NEUTROPHILS RELATIVE PERCENT: 67.1 % (ref 43–80)
PDW BLD-RTO: 13.3 FL (ref 11.5–15)
PLATELET # BLD: 141 E9/L (ref 130–450)
PMV BLD AUTO: 10 FL (ref 7–12)
POTASSIUM REFLEX MAGNESIUM: 3.9 MMOL/L (ref 3.5–5)
RBC # BLD: 3.02 E12/L (ref 3.8–5.8)
SODIUM BLD-SCNC: 141 MMOL/L (ref 132–146)
TOTAL PROTEIN: 5.2 G/DL (ref 6.4–8.3)
WBC # BLD: 4.8 E9/L (ref 4.5–11.5)

## 2022-08-24 PROCEDURE — 99232 SBSQ HOSP IP/OBS MODERATE 35: CPT | Performed by: INTERNAL MEDICINE

## 2022-08-24 PROCEDURE — 85014 HEMATOCRIT: CPT

## 2022-08-24 PROCEDURE — 6360000002 HC RX W HCPCS: Performed by: SURGERY

## 2022-08-24 PROCEDURE — 2580000003 HC RX 258: Performed by: SURGERY

## 2022-08-24 PROCEDURE — 2060000000 HC ICU INTERMEDIATE R&B

## 2022-08-24 PROCEDURE — 80053 COMPREHEN METABOLIC PANEL: CPT

## 2022-08-24 PROCEDURE — 6370000000 HC RX 637 (ALT 250 FOR IP): Performed by: SURGERY

## 2022-08-24 PROCEDURE — 85025 COMPLETE CBC W/AUTO DIFF WBC: CPT

## 2022-08-24 PROCEDURE — 99232 SBSQ HOSP IP/OBS MODERATE 35: CPT | Performed by: SURGERY

## 2022-08-24 PROCEDURE — 36415 COLL VENOUS BLD VENIPUNCTURE: CPT

## 2022-08-24 PROCEDURE — 85018 HEMOGLOBIN: CPT

## 2022-08-24 PROCEDURE — C9113 INJ PANTOPRAZOLE SODIUM, VIA: HCPCS | Performed by: SURGERY

## 2022-08-24 RX ADMIN — BISACODYL 20 MG: 5 TABLET, COATED ORAL at 13:10

## 2022-08-24 RX ADMIN — SODIUM CHLORIDE: 9 INJECTION, SOLUTION INTRAVENOUS at 00:57

## 2022-08-24 RX ADMIN — PANTOPRAZOLE SODIUM 40 MG: 40 INJECTION, POWDER, FOR SOLUTION INTRAVENOUS at 20:25

## 2022-08-24 RX ADMIN — POLYETHYLENE GLYCOL 3350, SODIUM SULFATE ANHYDROUS, SODIUM BICARBONATE, SODIUM CHLORIDE, POTASSIUM CHLORIDE 4000 ML: 236; 22.74; 6.74; 5.86; 2.97 POWDER, FOR SOLUTION ORAL at 13:11

## 2022-08-24 RX ADMIN — AMLODIPINE BESYLATE 10 MG: 10 TABLET ORAL at 10:28

## 2022-08-24 RX ADMIN — SODIUM CHLORIDE: 9 INJECTION, SOLUTION INTRAVENOUS at 13:14

## 2022-08-24 RX ADMIN — PANTOPRAZOLE SODIUM 40 MG: 40 INJECTION, POWDER, FOR SOLUTION INTRAVENOUS at 10:28

## 2022-08-24 RX ADMIN — SODIUM CHLORIDE, PRESERVATIVE FREE 10 ML: 5 INJECTION INTRAVENOUS at 20:25

## 2022-08-24 RX ADMIN — LEVOTHYROXINE SODIUM 50 MCG: 0.05 TABLET ORAL at 05:47

## 2022-08-24 ASSESSMENT — PAIN SCALES - GENERAL: PAINLEVEL_OUTOF10: 0

## 2022-08-24 NOTE — PROGRESS NOTES
[facility] Progress Note    --------------------------------------------------------------------------------------  Assessment  Anemia d/t GIB  YAJAIRA resolved  Hx HTN, hypothyroid, Avonne Pat witness    Plan  EGD some superficial antral ulcerations, no active bleed  For colonoscopy tomorrow  Pt declines any blood products  Please see orders for further plan of care    Code status  Full  DVT prophylaxis Held  Disposition  To be determined  --------------------------------------------------------------------------------------    Admission Date  8/22/2022  6:54 PM  Chief Complaint Rectal bleeding    Subjective  History of Present Illness  76M PMH HTN, hypothyroid, arthritis who presented to ED 8/22 with rectal bleeding beginning that morning, starting with melena but then transitioning to multiple episodes of hematochezia. Did have hx hemorrhoids but no bleeding issues in the past.  Has never had endoscopy. Admitted to diffuse abdominal cramping. Workup showed heme+ stool, stable vitals, hgb 12.0. CT showed bladder wall thickening that looked to be related to chronic bladder outlet obstruction from prostatomegaly. Pt was given Protonix and admitted w consult to surgical service. EGD this AM showed some superficial gastric antral ulcerations but no ezequiel bleeding and normal duodenum.     Colonoscopy tomorrow   Feeling relatively well  No abd pain, n/v  No further melena or BRBPR    Review of Systems - 12-point review of systems has been reviewed and is otherwise negative except as listed in the HPI    Objective  Physical Exam  Vitals: BP (!) 148/75   Pulse 54   Temp 97.9 °F (36.6 °C) (Oral)   Resp 18   Ht 5' 8\" (1.727 m)   Wt 165 lb (74.8 kg)   SpO2 99%   BMI 25.09 kg/m²   General: well-developed, well-nourished, no acute distress, cooperative  Skin: generally warm, dry, and intact, with normal color  HEENT: normocephalic, atraumatic, no gross abnormalities  Respiratory: clear to auscultation bilaterally without respiratory distress  Cardiovascular: regular rate and rhythm without murmur / rub / gallop  Abdominal: soft, nontender, nondistended, normoactive bowel sounds  Extremities: no obvious edema or deformity  Neurologic: awake, alert, no gross deficits  Psychiatric: normal affect, cooperative    Electronically signed by Radha Trinidad DO on 8/24/2022 at 1:05 PM

## 2022-08-24 NOTE — CARE COORDINATION
8/24/2022  Social Work Discharge Planning: This worker met with Pt to discuss  role and transition of care/discharge planning. Pt is independent at home with his spouse and is on room air. Pt is to have a C scope tomorrow. No DME.  Electronically signed by LOLA Russell on 8/24/2022 at 11:12 AM

## 2022-08-24 NOTE — PROGRESS NOTES
GENERAL SURGERY  DAILY PROGRESS NOTE  8/24/2022    CHIEF COMPLAINT:  Chief Complaint   Patient presents with    Rectal Bleeding     Noticed this morning        SUBJECTIVE:  NAEO. No nausea, vomiting or abdominal pain today. No further melena. OBJECTIVE:  BP (!) 151/77   Pulse 63   Temp 98 °F (36.7 °C) (Oral)   Resp 18   Ht 5' 8\" (1.727 m)   Wt 165 lb (74.8 kg)   SpO2 99%   BMI 25.09 kg/m²     CBC:   Lab Results   Component Value Date/Time    WBC 4.8 08/24/2022 04:00 AM    RBC 3.02 08/24/2022 04:00 AM    HGB 9.0 08/24/2022 04:00 AM    HCT 27.3 08/24/2022 04:00 AM    MCV 90.4 08/24/2022 04:00 AM    MCH 29.8 08/24/2022 04:00 AM    MCHC 33.0 08/24/2022 04:00 AM    RDW 13.3 08/24/2022 04:00 AM     08/24/2022 04:00 AM    MPV 10.0 08/24/2022 04:00 AM     CMP:    Lab Results   Component Value Date/Time     08/24/2022 04:00 AM    K 3.9 08/24/2022 04:00 AM     08/24/2022 04:00 AM    CO2 22 08/24/2022 04:00 AM    BUN 23 08/24/2022 04:00 AM    CREATININE 1.2 08/24/2022 04:00 AM    GFRAA >60 08/24/2022 04:00 AM    LABGLOM >60 08/24/2022 04:00 AM    GLUCOSE 98 08/24/2022 04:00 AM    PROT 5.2 08/24/2022 04:00 AM    LABALBU 3.4 08/24/2022 04:00 AM    CALCIUM 8.4 08/24/2022 04:00 AM    BILITOT 0.4 08/24/2022 04:00 AM    ALKPHOS 59 08/24/2022 04:00 AM    AST 19 08/24/2022 04:00 AM    ALT 10 08/24/2022 04:00 AM        GENERAL:  NAD. A&Ox3. LUNGS:  RA. No acute respiratory distress  CARDIOVASCULAR: hemodynamically stable  ABDOMEN:  Soft, non-distended, non tender. No guarding, rigidity, rebound. ASSESSMENT/PLAN:  67 y.o. male with superficial antral ulcerations and GIB.     Plan  -will need c scope, timing for 8/25  -continue PPI  -continue on CLD, NPO MN      Will KEE Valera MD  Surgery Resident PGY-2  8/24/2022  5:26 AM     Attending Physician Statement:    Chief Complaint:   Chief Complaint   Patient presents with    Rectal Bleeding     Noticed this morning        I have examined the patient and performed the key aspects of physical exam, reviewed the record (including all pertinent and new radiology images and laboratory findings), and discussed the case with the surgical team.  I agree with the assessment and plan with the following additions, corrections, and changes. 14pt review of symptoms completed and negative except as mentioned. Hemoglobin stable. Denies further bleeding. Cscope tomorrow. Jyoti Rivera MD  08/24/22      NOTE: This report, in part or full, may have been transcribed using voice recognition software. Every effort was made to ensure accuracy; however, inadvertent computerized transcription errors may be present. Please excuse any transcriptional grammatical or spelling errors that may have escaped my editorial review.

## 2022-08-24 NOTE — PLAN OF CARE
Eugene Wynn (:  2010) is a 14 y.o. male,Established patient, here for evaluation of the following chief complaint(s):  ADHD         ASSESSMENT/PLAN:  1. Attention deficit hyperactivity disorder (ADHD), combined type  -     amphetamine-dextroamphetamine (ADDERALL, 15MG,) 15 MG tablet; Take 1 tablet by mouth 2 times daily for 30 days. , Disp-60 tablet, R-0Normal  -    Increase medication to 15 mg BID. Mom given [place] to take to his school. Follow up in 3 months  2. Needs flu shot  -     INFLUENZA, QUADV, 0.5ML, 6 MO AND OLDER, IM, MDV, ([place])      Return in about 3 months (around 2021) for ADHD. Subjective   SUBJECTIVE/OBJECTIVE:  HPI  ADHD:  Current medication: adderall 10 mg BID  Side effects: none  Residual symptoms: complaints from two teachers about inattention, D in one class, unable to control hyperactivity during football  Previously on a higher dose, mom would like to keep as low as possible    Review of Systems   Constitutional: Negative for appetite change and unexpected weight change. Gastrointestinal: Negative for nausea. Psychiatric/Behavioral: Positive for behavioral problems and decreased concentration. Negative for agitation, confusion, dysphoric mood, hallucinations, self-injury, sleep disturbance and suicidal ideas. The patient is not nervous/anxious and is not hyperactive. Objective   Physical Exam  Vitals reviewed. Constitutional:       General: He is active. Cardiovascular:      Rate and Rhythm: Normal rate and regular rhythm. Heart sounds: Normal heart sounds. Pulmonary:      Effort: Pulmonary effort is normal.      Breath sounds: Normal breath sounds. Neurological:      Mental Status: He is alert and oriented for age. Cranial Nerves: No cranial nerve deficit. Psychiatric:         Attention and Perception: Perception normal. He is inattentive.          Mood and Affect: Mood and affect normal.         Speech: Speech normal. Problem: Safety - Adult  Goal: Free from fall injury  Outcome: Progressing

## 2022-08-25 ENCOUNTER — ANESTHESIA (OUTPATIENT)
Dept: ENDOSCOPY | Age: 77
DRG: 378 | End: 2022-08-25
Payer: OTHER GOVERNMENT

## 2022-08-25 VITALS
HEART RATE: 58 BPM | SYSTOLIC BLOOD PRESSURE: 159 MMHG | BODY MASS INDEX: 24.4 KG/M2 | TEMPERATURE: 97.4 F | DIASTOLIC BLOOD PRESSURE: 74 MMHG | OXYGEN SATURATION: 100 % | RESPIRATION RATE: 16 BRPM | WEIGHT: 161 LBS | HEIGHT: 68 IN

## 2022-08-25 LAB
ALBUMIN SERPL-MCNC: 3.4 G/DL (ref 3.5–5.2)
ALP BLD-CCNC: 58 U/L (ref 40–129)
ALT SERPL-CCNC: 11 U/L (ref 0–40)
ANION GAP SERPL CALCULATED.3IONS-SCNC: 9 MMOL/L (ref 7–16)
AST SERPL-CCNC: 24 U/L (ref 0–39)
BASOPHILS ABSOLUTE: 0.01 E9/L (ref 0–0.2)
BASOPHILS RELATIVE PERCENT: 0.2 % (ref 0–2)
BILIRUB SERPL-MCNC: 0.3 MG/DL (ref 0–1.2)
BUN BLDV-MCNC: 14 MG/DL (ref 6–23)
CALCIUM SERPL-MCNC: 8.5 MG/DL (ref 8.6–10.2)
CHLORIDE BLD-SCNC: 110 MMOL/L (ref 98–107)
CO2: 24 MMOL/L (ref 22–29)
CREAT SERPL-MCNC: 1.2 MG/DL (ref 0.7–1.2)
EOSINOPHILS ABSOLUTE: 0.2 E9/L (ref 0.05–0.5)
EOSINOPHILS RELATIVE PERCENT: 4.3 % (ref 0–6)
GFR AFRICAN AMERICAN: >60
GFR NON-AFRICAN AMERICAN: >60 ML/MIN/1.73
GLUCOSE BLD-MCNC: 94 MG/DL (ref 74–99)
HCT VFR BLD CALC: 26.3 % (ref 37–54)
HEMOGLOBIN: 8.7 G/DL (ref 12.5–16.5)
IMMATURE GRANULOCYTES #: 0.01 E9/L
IMMATURE GRANULOCYTES %: 0.2 % (ref 0–5)
LYMPHOCYTES ABSOLUTE: 1.15 E9/L (ref 1.5–4)
LYMPHOCYTES RELATIVE PERCENT: 24.6 % (ref 20–42)
MCH RBC QN AUTO: 30 PG (ref 26–35)
MCHC RBC AUTO-ENTMCNC: 33.1 % (ref 32–34.5)
MCV RBC AUTO: 90.7 FL (ref 80–99.9)
MONOCYTES ABSOLUTE: 0.41 E9/L (ref 0.1–0.95)
MONOCYTES RELATIVE PERCENT: 8.8 % (ref 2–12)
NEUTROPHILS ABSOLUTE: 2.89 E9/L (ref 1.8–7.3)
NEUTROPHILS RELATIVE PERCENT: 61.9 % (ref 43–80)
PDW BLD-RTO: 13.2 FL (ref 11.5–15)
PLATELET # BLD: 147 E9/L (ref 130–450)
PMV BLD AUTO: 9.9 FL (ref 7–12)
POTASSIUM REFLEX MAGNESIUM: 3.7 MMOL/L (ref 3.5–5)
RBC # BLD: 2.9 E12/L (ref 3.8–5.8)
SODIUM BLD-SCNC: 143 MMOL/L (ref 132–146)
TOTAL PROTEIN: 5.3 G/DL (ref 6.4–8.3)
WBC # BLD: 4.7 E9/L (ref 4.5–11.5)

## 2022-08-25 PROCEDURE — 80053 COMPREHEN METABOLIC PANEL: CPT

## 2022-08-25 PROCEDURE — 7100000010 HC PHASE II RECOVERY - FIRST 15 MIN: Performed by: SURGERY

## 2022-08-25 PROCEDURE — 2709999900 HC NON-CHARGEABLE SUPPLY: Performed by: SURGERY

## 2022-08-25 PROCEDURE — 88305 TISSUE EXAM BY PATHOLOGIST: CPT

## 2022-08-25 PROCEDURE — 2580000003 HC RX 258: Performed by: SURGERY

## 2022-08-25 PROCEDURE — 6370000000 HC RX 637 (ALT 250 FOR IP): Performed by: SURGERY

## 2022-08-25 PROCEDURE — 3700000000 HC ANESTHESIA ATTENDED CARE: Performed by: SURGERY

## 2022-08-25 PROCEDURE — 36415 COLL VENOUS BLD VENIPUNCTURE: CPT

## 2022-08-25 PROCEDURE — 3609010300 HC COLONOSCOPY W/BIOPSY SINGLE/MULTIPLE: Performed by: SURGERY

## 2022-08-25 PROCEDURE — 7100000011 HC PHASE II RECOVERY - ADDTL 15 MIN: Performed by: SURGERY

## 2022-08-25 PROCEDURE — 85025 COMPLETE CBC W/AUTO DIFF WBC: CPT

## 2022-08-25 PROCEDURE — 6360000002 HC RX W HCPCS: Performed by: NURSE ANESTHETIST, CERTIFIED REGISTERED

## 2022-08-25 PROCEDURE — 99239 HOSP IP/OBS DSCHRG MGMT >30: CPT | Performed by: INTERNAL MEDICINE

## 2022-08-25 PROCEDURE — 45380 COLONOSCOPY AND BIOPSY: CPT | Performed by: SURGERY

## 2022-08-25 PROCEDURE — 6360000002 HC RX W HCPCS: Performed by: SURGERY

## 2022-08-25 PROCEDURE — 3700000001 HC ADD 15 MINUTES (ANESTHESIA): Performed by: SURGERY

## 2022-08-25 PROCEDURE — 0DBE8ZZ EXCISION OF LARGE INTESTINE, VIA NATURAL OR ARTIFICIAL OPENING ENDOSCOPIC: ICD-10-PCS | Performed by: SURGERY

## 2022-08-25 PROCEDURE — C9113 INJ PANTOPRAZOLE SODIUM, VIA: HCPCS | Performed by: SURGERY

## 2022-08-25 PROCEDURE — 2500000003 HC RX 250 WO HCPCS: Performed by: NURSE ANESTHETIST, CERTIFIED REGISTERED

## 2022-08-25 RX ORDER — SUCRALFATE 1 G/1
1 TABLET ORAL EVERY 8 HOURS SCHEDULED
Qty: 120 TABLET | Refills: 3 | Status: SHIPPED | OUTPATIENT
Start: 2022-08-25

## 2022-08-25 RX ORDER — PROPOFOL 10 MG/ML
INJECTION, EMULSION INTRAVENOUS PRN
Status: DISCONTINUED | OUTPATIENT
Start: 2022-08-25 | End: 2022-08-25 | Stop reason: SDUPTHER

## 2022-08-25 RX ORDER — FERROUS SULFATE 325(65) MG
325 TABLET ORAL
Qty: 90 TABLET | Refills: 0 | COMMUNITY
Start: 2022-08-25

## 2022-08-25 RX ORDER — LIDOCAINE HYDROCHLORIDE 20 MG/ML
INJECTION, SOLUTION EPIDURAL; INFILTRATION; INTRACAUDAL; PERINEURAL PRN
Status: DISCONTINUED | OUTPATIENT
Start: 2022-08-25 | End: 2022-08-25 | Stop reason: SDUPTHER

## 2022-08-25 RX ORDER — SUCRALFATE 1 G/1
1 TABLET ORAL EVERY 8 HOURS SCHEDULED
Status: DISCONTINUED | OUTPATIENT
Start: 2022-08-25 | End: 2022-08-25 | Stop reason: HOSPADM

## 2022-08-25 RX ORDER — PANTOPRAZOLE SODIUM 40 MG/1
40 TABLET, DELAYED RELEASE ORAL
Qty: 60 TABLET | Refills: 0 | Status: SHIPPED | OUTPATIENT
Start: 2022-08-25

## 2022-08-25 RX ADMIN — LEVOTHYROXINE SODIUM 50 MCG: 0.05 TABLET ORAL at 10:35

## 2022-08-25 RX ADMIN — LIDOCAINE HYDROCHLORIDE 40 MG: 20 INJECTION, SOLUTION EPIDURAL; INFILTRATION; INTRACAUDAL; PERINEURAL at 07:02

## 2022-08-25 RX ADMIN — PROPOFOL 20 MG: 10 INJECTION, EMULSION INTRAVENOUS at 07:12

## 2022-08-25 RX ADMIN — PROPOFOL 80 MG: 10 INJECTION, EMULSION INTRAVENOUS at 07:02

## 2022-08-25 RX ADMIN — PROPOFOL 10 MG: 10 INJECTION, EMULSION INTRAVENOUS at 07:21

## 2022-08-25 RX ADMIN — SODIUM CHLORIDE: 9 INJECTION, SOLUTION INTRAVENOUS at 06:49

## 2022-08-25 RX ADMIN — SUCRALFATE 1 G: 1 TABLET ORAL at 14:52

## 2022-08-25 RX ADMIN — PROPOFOL 20 MG: 10 INJECTION, EMULSION INTRAVENOUS at 07:05

## 2022-08-25 RX ADMIN — SODIUM CHLORIDE, PRESERVATIVE FREE 10 ML: 5 INJECTION INTRAVENOUS at 10:37

## 2022-08-25 RX ADMIN — PROPOFOL 30 MG: 10 INJECTION, EMULSION INTRAVENOUS at 07:08

## 2022-08-25 RX ADMIN — PANTOPRAZOLE SODIUM 40 MG: 40 INJECTION, POWDER, FOR SOLUTION INTRAVENOUS at 14:51

## 2022-08-25 RX ADMIN — AMLODIPINE BESYLATE 10 MG: 10 TABLET ORAL at 10:35

## 2022-08-25 ASSESSMENT — PAIN SCALES - GENERAL
PAINLEVEL_OUTOF10: 0

## 2022-08-25 ASSESSMENT — PAIN - FUNCTIONAL ASSESSMENT: PAIN_FUNCTIONAL_ASSESSMENT: 0-10

## 2022-08-25 NOTE — DISCHARGE SUMMARY
[facility] Hospitalist       Hospitalist Physician Discharge Summary       Miles Hamlin MD  [address] [facility]  [phone]    Schedule an appointment as soon as possible for a visit in 1 week(s)  Please call for follow up post hospital stay appt. Jose M White MD  [address] [facility] [place] [phone]    Schedule an appointment as soon as possible for a visit in 1 week(s)  Please call for follow up post hospital stay appt. Activity level: As Tolerated    Diet: ADULT DIET; Regular; GI [place] (GERD/Peptic Ulcer)      Condition at discharge: Stable    Dispo:Home        Patient ID:  Skye Collier  [id number]  80 y.o.  1945    Admit date: 8/22/2022    Discharge date and time:  8/25/2022  2:52 PM    Admission Diagnoses: Principal Problem:    GI bleed  Active Problems:    Orthostatic hypotension    Primary hypertension    Benign prostatic hyperplasia    Pain  Resolved Problems:    * No resolved hospital problems. *      Discharge Diagnoses: Principal Problem:    GI bleed  Active Problems:    Orthostatic hypotension    Primary hypertension    Benign prostatic hyperplasia    Pain  Resolved Problems:    * No resolved hospital problems. *      Consults:  IP CONSULT TO GENERAL SURGERY  IP CONSULT TO SOCIAL WORK  IP CONSULT TO SPIRITUAL SERVICES    Procedures:   8/23 EGD  8/25/22 Colonoscopy    Hospital Course:     67year old male presented to [address] [place] [address] ED with complaints of rectal bleeding beginning morning of presentation. Noted   6 episodes of bright red blood per rectum that he states is just blood. Multiple episodes while in ED course. Hemoccult was grossly positive. CT of abdomen was unremarkable. Decision to admit pt for further evaluation and treatment. PPI BID. Carafate. 8/23 EGD few superficial antral ulcerations. no definite evidence of significant upper GI bleed. 8/25 Colonoscopy Small hemorrhoids. mild diverticulosis.  diminutive polyp status post forcep polypectomy. sigmoid there are 2 diminutive polyp status post forcep polypectomy. grade 1 hemorrhoids. Pt remained hemodynamically stable and can be discharged at this time. Pt will be instructed to follow up with PCP and GS upon DC. Aspirin held a this time, will defer to General Surgery and PCP for resuming ASA. Discharge Exam:  Vitals:    08/25/22 0635 08/25/22 0733 08/25/22 0748 08/25/22 0803   BP: (!) 168/77 124/62 (!) 146/73 (!) 159/74   Pulse: 54 57 58 58   Resp: 16 16 16 16   Temp: 97.5 °F (36.4 °C) 97.4 °F (36.3 °C)  97.4 °F (36.3 °C)   TempSrc: Temporal Temporal  Temporal   SpO2: 98% 97% 100% 100%   Weight:       Height:           General Appearance: alert and oriented to person, place and time and in no acute distress  Skin: warm and dry  Head: normocephalic and atraumatic  Eyes: pupils equal, round, and reactive to light, extraocular eye movements intact, conjunctivae normal  Neck: neck supple and non tender without mass   Pulmonary/Chest: clear to auscultation bilaterally- no wheezes, rales or rhonchi, normal air movement, no respiratory distress  Cardiovascular: normal rate, normal S1 and S2 and no carotid bruits  Abdomen: soft, non-tender, non-distended, normal bowel sounds, no masses or organomegaly  Extremities: no cyanosis, no clubbing and no edema  Neurologic: no cranial nerve deficit and speech normal  I/O last 3 completed shifts:  In: -   Out: 800 [Urine:800]  No intake/output data recorded.       LABS:  Recent Labs     08/23/22  0355 08/24/22  0400 08/25/22  0445    141 143   K 4.0 3.9 3.7    110* 110*   CO2 22 22 24   BUN 36* 23 14   CREATININE 1.1 1.2 1.2   GLUCOSE 106* 98 94   CALCIUM 8.4* 8.4* 8.5*       Recent Labs     08/23/22  0355 08/23/22  1325 08/24/22  0400 08/24/22  1218 08/25/22  0445   WBC 6.2  --  4.8  --  4.7   RBC 3.25*  --  3.02*  --  2.90*   HGB 9.8*   < > 9.0* 9.3* 8.7*   HCT 29.3*   < > 27.3* 28.2* 26.3*   MCV 90.2  --  90.4  --  90.7   MCH 30.2  --  29.8 --  30.0   MCHC 33.4  --  33.0  --  33.1   RDW 13.3  --  13.3  --  13.2     --  141  --  147   MPV 9.8  --  10.0  --  9.9    < > = values in this interval not displayed. No results for input(s): POCGLU in the last 72 hours. Imaging:  CT ABDOMEN PELVIS W IV CONTRAST Additional Contrast? None    Result Date: 8/22/2022  EXAMINATION: CT OF THE ABDOMEN AND PELVIS WITH CONTRAST 8/22/2022 9:42 pm TECHNIQUE: CT of the abdomen and pelvis was performed with the administration of intravenous contrast. Multiplanar reformatted images are provided for review. Automated exposure control, iterative reconstruction, and/or weight based adjustment of the mA/kV was utilized to reduce the radiation dose to as low as reasonably achievable. COMPARISON: 03/30/2017 HISTORY: ORDERING SYSTEM PROVIDED HISTORY: rectal bleeding TECHNOLOGIST PROVIDED HISTORY: Additional Contrast?->None Reason for exam:->rectal bleeding Decision Support Exception - unselect if not a suspected or confirmed emergency medical condition->Emergency Medical Condition (MA) FINDINGS: Lower Chest:  Visualized portion of the lower chest demonstrates no acute abnormality. Organs: The liver, gallbladder, spleen, pancreas, adrenals, and kidneys are unremarkable. GI/Bowel: There is no evidence of bowel obstruction. No evidence of abnormal bowel wall thickening or distension. There is diverticulosis. The appendix is normal. Pelvis: There is marked circumferential bladder wall thickening. The prostate is enlarged. Peritoneum/Retroperitoneum: No evidence of ascites or free air. No evidence of retroperitoneal lymphadenopathy. Aorta is normal in caliber without acute abnormality. Bones/Soft Tissues:  No acute abnormality of the visualized osseous structures. No acute abdominopelvic abnormality. Marked bladder wall thickening, which may be related to chronic bladder outlet obstruction given prostatomegaly.   However, superimposed infection is not excluded. Recommend clinical and laboratory correlation. Patient Instructions:   Current Discharge Medication List        START taking these medications    Details   sucralfate (CARAFATE) 1 GM tablet Take 1 tablet by mouth every 8 hours  Qty: 120 tablet, Refills: 3      pantoprazole (PROTONIX) 40 MG tablet Take 1 tablet by mouth 2 times daily (before meals)  Qty: 60 tablet, Refills: 0           CONTINUE these medications which have NOT CHANGED    Details          atorvastatin (LIPITOR) 40 MG tablet Take 1 tablet by mouth nightly  Qty: 30 tablet, Refills: 0      hydrALAZINE (APRESOLINE) 25 MG tablet Take 1 tablet by mouth in the morning and 1 tablet at noon and 1 tablet before bedtime. Qty: 90 tablet, Refills: 0      amLODIPine (NORVASC) 5 MG tablet Take 2 tablets by mouth daily  Qty: 20 tablet, Refills: 0      docusate sodium (COLACE, DULCOLAX) 100 MG CAPS Take 100 mg by mouth nightly  Qty: 30 capsule, Refills: 0      levothyroxine (SYNTHROID) 50 MCG tablet Take 1 tablet by mouth Daily  Qty: 30 tablet, Refills: 3      finasteride (PROSCAR) 5 MG tablet Take 5 mg by mouth daily      tamsulosin (FLOMAX) 0.4 MG capsule Take 1 capsule by mouth daily for 14 days  Qty: 14 capsule, Refills: 0               Note that more than 30 minutes was spent in preparing discharge papers, discussing discharge with patient, medication review, etc.    NOTE: This report was transcribed using voice recognition software. Every effort was made to ensure accuracy; however, inadvertent computerized transcription errors may be present. Signed:  Electronically signed by Chelsi Go CNP on 8/25/2022 at 2:52 PM

## 2022-08-25 NOTE — CARE COORDINATION
8/25/2022  Social Work Discharge Planning:Possible discharge today. C scope today. Pt is independent at home with his spouse and is on room air. No DME.  Electronically signed by LOLA Lim on 8/25/2022 at 10:04 AM

## 2022-08-25 NOTE — ANESTHESIA PRE PROCEDURE
tablet 10 mg  10 mg Oral Daily Giovana Unger MD   10 mg at 08/24/22 1028    sodium chloride flush 0.9 % injection 5-40 mL  5-40 mL IntraVENous 2 times per day Giovana Unger MD   10 mL at 08/24/22 2025    sodium chloride flush 0.9 % injection 5-40 mL  5-40 mL IntraVENous PRN Giovana Unger MD        0.9 % sodium chloride infusion   IntraVENous PRN Giovana Unger MD        ondansetron (ZOFRAN-ODT) disintegrating tablet 4 mg  4 mg Oral Q8H PRN Giovana Unger MD        Or    ondansetron [facility]) injection 4 mg  4 mg IntraVENous Q6H PRN Giovana Unger MD        acetaminophen (TYLENOL) tablet 650 mg  650 mg Oral Q6H PRN Giovana Unger MD        Or    acetaminophen (TYLENOL) suppository 650 mg  650 mg Rectal Q6H PRN Giovana Unger MD        0.9 % sodium chloride infusion   IntraVENous Continuous Giovana Unger MD 75 mL/hr at 08/24/22 1314 New Bag at 08/24/22 1314    pantoprazole (PROTONIX) injection 40 mg  40 mg IntraVENous BID Giovana Unger MD   40 mg at 08/24/22 2025    levothyroxine (SYNTHROID) tablet 50 mcg  50 mcg Oral Daily Giovana Unger MD   50 mcg at 08/24/22 0547       Allergies:  No Known Allergies    Problem List:    Patient Active Problem List   Diagnosis Code    Chest pain R07.9    GI bleed K92.2    Orthostatic hypotension I95.1    Primary hypertension I10    Benign prostatic hyperplasia N40.0    Pain R52       Past Medical History:        Diagnosis Date    Arthritis     Hypertension     Thyroid disease        Past Surgical History:        Procedure Laterality Date    UPPER GASTROINTESTINAL ENDOSCOPY N/A 8/23/2022    EGD BIOPSY performed by Giovana Unger MD at [facility] ENDOSCOPY       Social History:    Social History     Tobacco Use    Smoking status: Never    Smokeless tobacco: Never   Substance Use Topics    Alcohol use: Yes     Comment: occasionaly                                 Counseling given: Not Answered      Vital Signs (Current): There were no vitals filed for this visit. BP Readings from Last 3 Encounters:   08/24/22 (!) 144/7   07/24/22 (!) 161/99   04/03/17 (!) 147/93       NPO Status:                                                                                 BMI:   Wt Readings from Last 3 Encounters:   08/22/22 165 lb (74.8 kg)   07/22/22 165 lb (74.8 kg)   03/30/17 176 lb (79.8 kg)     There is no height or weight on file to calculate BMI.    CBC:   Lab Results   Component Value Date/Time    WBC 4.8 08/24/2022 04:00 AM    RBC 3.02 08/24/2022 04:00 AM    HGB 9.3 08/24/2022 12:18 PM    HCT 28.2 08/24/2022 12:18 PM    MCV 90.4 08/24/2022 04:00 AM    RDW 13.3 08/24/2022 04:00 AM     08/24/2022 04:00 AM       CMP:   Lab Results   Component Value Date/Time     08/24/2022 04:00 AM    K 3.9 08/24/2022 04:00 AM     08/24/2022 04:00 AM    CO2 22 08/24/2022 04:00 AM    BUN 23 08/24/2022 04:00 AM    CREATININE 1.2 08/24/2022 04:00 AM    GFRAA >60 08/24/2022 04:00 AM    LABGLOM >60 08/24/2022 04:00 AM    GLUCOSE 98 08/24/2022 04:00 AM    PROT 5.2 08/24/2022 04:00 AM    CALCIUM 8.4 08/24/2022 04:00 AM    BILITOT 0.4 08/24/2022 04:00 AM    ALKPHOS 59 08/24/2022 04:00 AM    AST 19 08/24/2022 04:00 AM    ALT 10 08/24/2022 04:00 AM       POC Tests: No results for input(s): POCGLU, POCNA, POCK, POCCL, POCBUN, POCHEMO, POCHCT in the last 72 hours.     Coags: No results found for: PROTIME, INR, APTT    HCG (If Applicable): No results found for: PREGTESTUR, PREGSERUM, HCG, HCGQUANT     ABGs: No results found for: PHART, PO2ART, ZTQ6LTU, QIR2FPA, BEART, X2MLPMDT     Type & Screen (If Applicable):  No results found for: LABABO, LABRH    Drug/Infectious Status (If Applicable):  No results found for: HIV, HEPCAB    COVID-19 Screening (If Applicable):   Lab Results   Component Value Date/Time    COVID19 Not Detected 07/22/2022 06:38 PM           Anesthesia Evaluation  Patient summary reviewed and Nursing notes reviewed no history of anesthetic complications:   Airway: Mallampati: III  TM distance: >3 FB   Neck ROM: full  Mouth opening: > = 3 FB   Dental:          Pulmonary:Negative Pulmonary ROS breath sounds clear to auscultation                             Cardiovascular:    (+) hypertension: moderate, angina:,       NYHA Classification: II  ECG reviewed  Rhythm: regular  Rate: normal                    Neuro/Psych:   Negative Neuro/Psych ROS              GI/Hepatic/Renal:   (+) PUD, bowel prep,           Endo/Other:    (+) blood dyscrasia: arthritis: OA and no interval change. , . Abdominal:             Vascular: negative vascular ROS. Other Findings:             Anesthesia Plan      MAC     ASA 3       Induction: intravenous. Anesthetic plan and risks discussed with patient.                   Patient to be re-evaluated by DOS Anesthesiologist      Patient seen and evaluated Kenan Rivera MD   8/24/2022

## 2022-08-25 NOTE — OP NOTE
Colonoscopy Op Note  PATIENT: Mateo Brink    DATE OF PROCEDURE: 8/25/2022    SURGEON: Declan Ceballos MD    PREOPERATIVE DIAGNOSIS: Diagnostic colonoscopy for melena    POSTOPERATIVE DIAGNOSIS: Same, mild diverticulosis, colon polyps    OPERATION: Procedure(s):  COLONOSCOPY WITH BIOPSY    ANESTHESIA: Local monitored anesthesia. ESTIMATED BLOOD LOSS: nil     COMPLICATIONS: None. SPECIMENS:   ID Type Source Tests Collected by Time Destination   A : ileo cecal valve polyp bx  Tissue Tissue SURGICAL PATHOLOGY Declan Ceballos MD 8/25/2022 0715    B : sigmoid colon polyps bx x2 Tissue Colon SURGICAL PATHOLOGY Declan Ceballos MD 8/25/2022 1967        HISTORY: The patient is a 67y.o. year old male with history of above preop diagnosis. I recommended colonoscopy with possible biopsy or polypectomy and I explained the risk, benefits, expected outcome, and alternatives to the procedure. Risks included but are not limited to bleeding, infection, respiratory distress, hypotension, and perforation of the colon. The patient understands and is in agreement. PROCEDURE: The patient was given IV conscious sedation per anesthesia. The patient was given supplemental oxygen by nasal cannula. The colonoscope was inserted per rectum and advanced under direct vision to the cecum without difficulty, identified by appendiceal orifice and ileocecal valve. The prep was good so exam was adequate. FINDINGS:    ETHEL: Small hemorrhoids    Terminal Ileum: normal    Colon: Overlying the ileocecal valve there is a diminutive polyp status post forcep polypectomy. In the sigmoid there are 2 diminutive polyp status post forcep polypectomy. There is mild diverticulosis. Rectum/Anus: examined in normal and retroflexed positions -grade 1 hemorrhoids    The colon was decompressed and the scope was removed. The withdraw time was approximately 9 minutes. The patient tolerated the procedure well.      ASSESSMENT/PLAN: Follow-up biopsies  Fiber diet  Given he is [facility], recommend limiting blood draws and using pediatric tubes if possible. Would also recommend iron therapy. Colorectal Cancer Screening - recommend repeat colonoscopy in 3 years (may change pending biopsy results). Sooner if issues/concerns.     Ildefonso Decker MD  08/25/22  7:28 AM

## 2022-08-25 NOTE — ANESTHESIA POSTPROCEDURE EVALUATION
Department of Anesthesiology  Postprocedure Note    Patient: Kierra Spaulding  MRN: [id number]  YOB: 1945  Date of evaluation: 8/25/2022      Procedure Summary     Date: 08/25/22 Room / Location: [address] / [facility]    Anesthesia Start: 0571 Anesthesia Stop:     Procedure: COLONOSCOPY WITH BIOPSY Diagnosis:       Pain      (Pain [R52])    Surgeons: Wilian Recinos MD Responsible Provider: Smiley Deal MD    Anesthesia Type: MAC ASA Status: 3          Anesthesia Type: No value filed.     Laxmi Phase I: Laxmi Score: 10    Laxmi Phase II: Laxmi Score: 10      Anesthesia Post Evaluation    Patient location during evaluation: PACU  Patient participation: complete - patient participated  Level of consciousness: awake  Airway patency: patent  Nausea & Vomiting: no nausea and no vomiting  Complications: no  Cardiovascular status: hemodynamically stable  Respiratory status: acceptable  Hydration status: euvolemic

## 2022-08-25 NOTE — PROGRESS NOTES
Nursing Transfer Note    Data:  Summary of patients progress: Dr Enoch martinez  Reason for transfer: next level of care    Action:  Explained reason for transfer to Patient. Report given to: Maria Teresa Roa, using RN Handoff Navigator.   Mode of transportation: bed    Response:  RN Recommendations: diet

## 2022-09-12 NOTE — OP NOTE
Dr. Wilson please see e-advice from patient and advise. Thank You   EGD Op Note    DATE OF PROCEDURE: 8/23/2022     SURGEON: Natali Kramer MD    PREOPERATIVE DIAGNOSIS: Melena    POSTOPERATIVE DIAGNOSIS: Same, few superficial antral ulcerations but no definite evidence of significant upper GI bleed    OPERATION: Procedure(s):  EGD BIOPSY    ANESTHESIA: Local monitored anesthesia. ESTIMATED BLOOD LOSS: minimal    COMPLICATIONS: None. SPECIMENS:    ID Type Source Tests Collected by Time Destination   A : Antral Ulcer Bx Tissue Stomach SURGICAL PATHOLOGY Natali Kramer MD 8/23/2022 1032        HISTORY: The patient is a 67y.o. year old male with history of above preop diagnosis. I recommended esophagogastroduodenoscopy with possible biopsy and I explained the risk, benefits, expected outcome, and alternatives to the procedure. Risks included but are not limited to bleeding, infection, respiratory distress, hypotension, and perforation of the esophagus, stomach, or duodenum. Patient understands and is in agreement. PROCEDURE: The patient was given IV conscious sedation per anesthesia. The patient was given supplemental oxygen by nasal cannula. The gastroscope was inserted orally and advanced under direct vision through the esophagus, through the stomach, through the pylorus, and into the duodenum. Findings:  Duodenum: normal    Stomach: There are 3 superficial antral ulcerations. Biopsies were taken. There is bile upper GI tract without evidence of recent upper GI bleed. There also did initially appear to be a few telangiectasias near the incisura towards the antrum, however these were irrigated and did not reform or bleed afterwards. Esophagus: normal    Larynx: not examined    The scope was removed and the patient tolerated the procedure well. IMPRESSION/PLAN:   Continue PPI  Will need colonoscopy.       Natali Kramer MD  08/23/22  10:40 AM

## 2022-09-22 PROBLEM — R52 PAIN: Status: RESOLVED | Noted: 2022-08-22 | Resolved: 2022-09-22

## (undated) DEVICE — BLOCK BITE 60FR RUBBER ADLT DENTAL

## (undated) DEVICE — GRADUATE TRIANG MEASURE 1000ML BLK PRNT

## (undated) DEVICE — SPONGE GZ W4XL4IN RAYON POLY FILL CVR W/ NONWOVEN FAB

## (undated) DEVICE — FORCEPS BX L240CM JAW DIA2.4MM ORNG L CAP W/ NDL DISP RAD

## (undated) DEVICE — GLOVE ORTHO 8   MSG9480

## (undated) DEVICE — FORCEPS BX OVL CUP FEN DISPOSABLE CAP L 160CM RAD JAW 4